# Patient Record
Sex: FEMALE | HISPANIC OR LATINO | Employment: FULL TIME | URBAN - METROPOLITAN AREA
[De-identification: names, ages, dates, MRNs, and addresses within clinical notes are randomized per-mention and may not be internally consistent; named-entity substitution may affect disease eponyms.]

---

## 2017-05-22 ENCOUNTER — TELEPHONE (OUTPATIENT)
Dept: OTHER | Facility: CLINIC | Age: 32
End: 2017-05-22

## 2017-05-22 NOTE — TELEPHONE ENCOUNTER
5/22/2017    Call Regarding Onboarding are Choices    Attempt 1    Message on voicemail     Comments: 0 DEP      Outreach   KV

## 2017-06-06 NOTE — TELEPHONE ENCOUNTER
6/6/2017    Call Regarding Onboarding [employer] Choices    Attempt 2    Message on voicemail     Comments:       Outreach   christopher

## 2017-06-14 NOTE — TELEPHONE ENCOUNTER
"6/14/2017        Patient busy declined Onboarding offer, will call Healthcare insurance if need help    Can \"Vy\" Omar  Central Scheduler      "

## 2017-07-03 DIAGNOSIS — F33.0 MAJOR DEPRESSIVE DISORDER, RECURRENT EPISODE, MILD (H): ICD-10-CM

## 2017-07-03 NOTE — TELEPHONE ENCOUNTER
Medication Detail      Disp Refills Start End JAY   sertraline (ZOLOFT) 100 MG tablet 90 tablet 1 12/22/2016  No   Sig: Take 1 tablet (100 mg) by mouth daily       Last Office Visit with [facility] primary care provider:  12/22/16        Last PHQ-9 score on record=   PHQ-9 SCORE 12/22/2016   Total Score MyChart -   Total Score 5

## 2017-07-05 ENCOUNTER — MYC REFILL (OUTPATIENT)
Dept: FAMILY MEDICINE | Facility: CLINIC | Age: 32
End: 2017-07-05

## 2017-07-05 DIAGNOSIS — F33.0 MAJOR DEPRESSIVE DISORDER, RECURRENT EPISODE, MILD (H): ICD-10-CM

## 2017-07-05 RX ORDER — SERTRALINE HYDROCHLORIDE 100 MG/1
100 TABLET, FILM COATED ORAL DAILY
Qty: 30 TABLET | Refills: 0 | Status: SHIPPED | OUTPATIENT
Start: 2017-07-05 | End: 2017-07-25

## 2017-07-05 RX ORDER — SERTRALINE HYDROCHLORIDE 100 MG/1
100 TABLET, FILM COATED ORAL DAILY
Qty: 0.01 TABLET | Refills: 0 | Status: SHIPPED | OUTPATIENT
Start: 2017-07-05 | End: 2017-07-05

## 2017-07-05 NOTE — TELEPHONE ENCOUNTER
Message from [employer]:  Original authorizing provider: Alondra Tatum MD, MD Seema Navas would like a refill of the following medications:  sertraline (ZOLOFT) 100 MG tablet [Alondra Tatum MD, MD]    [id number] [address]    Comment:

## 2017-07-05 NOTE — TELEPHONE ENCOUNTER
Medication is being filled for 1 time refill only due to:  Patient needs to be seen because due for depression F/U.     Signed Prescriptions:                        Disp   Refills    sertraline (ZOLOFT) 100 MG tablet          30 tab*0        Sig: Take 1 tablet (100 mg) by mouth daily PLEASE SCHEDULE           AN APPOINTMENT TO RECEIVE FUTURE REFILLS           [phone]  Authorizing Provider: COREY RESENDIZ  Ordering User: PHILOMENA FRANCISCO      Routing to  Reception - please advise depression F/U office visit    Thank you,  Philomena Francisco RN

## 2017-07-05 NOTE — TELEPHONE ENCOUNTER
DUPLICATE    Signed Prescriptions:                        Disp   Refills    sertraline (ZOLOFT) 100 MG tablet          0.01 t*0        Sig: Take 1 tablet (100 mg) by mouth daily  Authorizing Provider: WASHINGTON HUGHES  Ordering User: PHILOMENA VILLAFUERTE      Closing encounter - no further actions needed at this time    Sent shayy Villafuerte RN

## 2017-07-25 ENCOUNTER — OFFICE VISIT (OUTPATIENT)
Dept: FAMILY MEDICINE | Facility: CLINIC | Age: 32
End: 2017-07-25
Payer: COMMERCIAL

## 2017-07-25 VITALS
BODY MASS INDEX: 23.26 KG/M2 | TEMPERATURE: 98.9 F | WEIGHT: 136.25 LBS | DIASTOLIC BLOOD PRESSURE: 64 MMHG | OXYGEN SATURATION: 97 % | HEIGHT: 64 IN | SYSTOLIC BLOOD PRESSURE: 104 MMHG | HEART RATE: 64 BPM

## 2017-07-25 DIAGNOSIS — F33.0 MAJOR DEPRESSIVE DISORDER, RECURRENT EPISODE, MILD (H): ICD-10-CM

## 2017-07-25 DIAGNOSIS — F33.9 EPISODE OF RECURRENT MAJOR DEPRESSIVE DISORDER, UNSPECIFIED DEPRESSION EPISODE SEVERITY (H): Primary | ICD-10-CM

## 2017-07-25 PROCEDURE — 99213 OFFICE O/P EST LOW 20 MIN: CPT | Performed by: FAMILY MEDICINE

## 2017-07-25 RX ORDER — SERTRALINE HYDROCHLORIDE 100 MG/1
100 TABLET, FILM COATED ORAL DAILY
Qty: 90 TABLET | Refills: 1 | Status: SHIPPED | OUTPATIENT
Start: 2017-07-25 | End: 2018-02-04

## 2017-07-25 ASSESSMENT — ANXIETY QUESTIONNAIRES
1. FEELING NERVOUS, ANXIOUS, OR ON EDGE: SEVERAL DAYS
GAD7 TOTAL SCORE: 2
IF YOU CHECKED OFF ANY PROBLEMS ON THIS QUESTIONNAIRE, HOW DIFFICULT HAVE THESE PROBLEMS MADE IT FOR YOU TO DO YOUR WORK, TAKE CARE OF THINGS AT HOME, OR GET ALONG WITH OTHER PEOPLE: SOMEWHAT DIFFICULT
2. NOT BEING ABLE TO STOP OR CONTROL WORRYING: SEVERAL DAYS
7. FEELING AFRAID AS IF SOMETHING AWFUL MIGHT HAPPEN: NOT AT ALL
6. BECOMING EASILY ANNOYED OR IRRITABLE: NOT AT ALL
5. BEING SO RESTLESS THAT IT IS HARD TO SIT STILL: NOT AT ALL
3. WORRYING TOO MUCH ABOUT DIFFERENT THINGS: NOT AT ALL

## 2017-07-25 ASSESSMENT — PATIENT HEALTH QUESTIONNAIRE - PHQ9: 5. POOR APPETITE OR OVEREATING: NOT AT ALL

## 2017-07-25 NOTE — PROGRESS NOTES
"  SUBJECTIVE:                                                    Seema Navas is a 32 year old female who presents to clinic today for the following health issues:    Depression Followup    Status since last visit: Stable     See PHQ-9 for current symptoms.  Other associated symptoms: anxiety     Complicating factors:   Significant life event:  No   Current substance abuse:  None  Anxiety or Panic symptoms:  Yes-  Anxiety     PHQ-9  English  PHQ-9   Any Language      Amount of exercise or physical activity: None    Problems taking medications regularly: No    Medication side effects: none  Diet: vegetarian/vegan    Clinic visit on 12/22/16:  \"1. Major depressive disorder, recurrent episode, mild (H)  Stable. She had been prescribed 150 mg of sertraline but had been taking 100 mg and tolerated well. Her moods have been stable and improved on sertraline. She notes ooccasional  headaches as a side effect since on sertraline. She does not want to change her medication.   - sertraline (ZOLOFT) 100 MG tablet; Take 1 tablet (100 mg) by mouth daily  Dispense: 90 tablet; Refill: 1  2. Encounter for surveillance of contraceptive pills  Doing well on OCP; no h/o migraine with visual changes .  - levonorgestrel-ethinyl estradiol (AVIANE) 0.1-20 MG-MCG per tablet; Take 1 tablet by mouth daily 1 TABLET DAILY  Dispense: 84 tablet; Refill: 3  3. Need for prophylactic vaccination and inoculation against influenza  - FLU VAC, SPLIT VIRUS IM > 3 YO (QUADRIVALENT) [32311]  - Vaccine Administration, Initial [21124]\"      She states she is doing alright, but notes she still wants to sleep too much and has decreased energy. She has difficulty getting to work on time at times. Would like sertraline renewal and would like to remain at her current dose and requests a referral for therapy. Denies difficulty getting to sleep or staying asleep. Denies SI.       Problem list and histories reviewed & adjusted, as indicated.  Additional history: " as documented  Patient Active Problem List   Diagnosis     CARDIOVASCULAR SCREENING; LDL GOAL LESS THAN 160     CHEYENNE II (cervical intraepithelial neoplasia II)     Major depression     Dysthymic disorder     Past Surgical History:   Procedure Laterality Date     HC TOOTH EXTRACTION W/FORCEP  Jan 2010     NO HISTORY OF SURGERY         Social History   Substance Use Topics     Smoking status: Never Smoker     Smokeless tobacco: Never Used     Alcohol use 0.5 - 1.0 oz/week     1 - 2 Standard drinks or equivalent per week      Comment: 2 week      Family History   Problem Relation Age of Onset     Allergies Mother      bees         Current Outpatient Prescriptions   Medication Sig Dispense Refill     sertraline (ZOLOFT) 100 MG tablet Take 1 tablet (100 mg) by mouth daily PLEASE SCHEDULE AN APPOINTMENT TO RECEIVE FUTURE REFILLS [phone] 90 tablet 1     levonorgestrel-ethinyl estradiol (AVIANE) 0.1-20 MG-MCG per tablet Take 1 tablet by mouth daily 1 TABLET DAILY 84 tablet 3     [DISCONTINUED] sertraline (ZOLOFT) 100 MG tablet Take 1 tablet (100 mg) by mouth daily PLEASE SCHEDULE AN APPOINTMENT TO RECEIVE FUTURE REFILLS [phone] 30 tablet 0     order for DME Equipment being ordered: Therapy Light for Seasonal affective disorder 1 Device 0     Cholecalciferol (VITAMIN D) 2000 UNITS tablet Take 2,000 Units by mouth daily (Patient not taking: Reported on 7/25/2017) 100 tablet 0     Allergies   Allergen Reactions     No Known Drug Allergies      Recent Labs   Lab Test  02/26/16   0949  06/21/11   0912   LDL   --   72   HDL   --   72   TRIG   --   87   CR  0.84   --    GFRESTIMATED  78   --    GFRESTBLACK  >90   GFR Calc     --    POTASSIUM  4.3   --       BP Readings from Last 3 Encounters:   07/25/17 104/64   12/22/16 104/68   03/11/16 128/78    Wt Readings from Last 3 Encounters:   07/25/17 61.8 kg (136 lb 4 oz)   12/22/16 61.7 kg (136 lb)   03/07/16 60.8 kg (134 lb)        Reviewed and updated as  "needed this visit by clinical staff  Reviewed and updated as needed this visit by Provider    ROS:  Tremayne CALLES      This document serves as a record of the services and decisions personally performed and made by Alondra Tatum MD. It was created on her behalf by Gage Spencer, a trained medical scribe. The creation of this document is based the provider's statements to the medical scribes.    Scribe Gage Spencer, July 25, 2017    OBJECTIVE:     /64 (Cuff Size: Adult Regular)  Pulse 64  Temp 98.9  F (37.2  C) (Oral)  Ht 1.626 m (5' 4\")  Wt 61.8 kg (136 lb 4 oz)  SpO2 97%  BMI 23.39 kg/m2  Body mass index is 23.39 kg/(m^2).  GENERAL: healthy, alert and no distress  PSYCH: mentation appears normal, affect normal/bright    Diagnostic Test Results:  none     ASSESSMENT/PLAN:   1. Major depressive disorder, recurrent episode, mild (H)  She would like to remain on current dose of sertraline at 100mg daily. She had headaches on higher dose. She would like to schedule with Jan Hunter for therapy.   - sertraline (ZOLOFT) 100 MG tablet; Take 1 tablet (100 mg) by mouth daily PLEASE SCHEDULE AN APPOINTMENT TO RECEIVE FUTURE REFILLS [phone]  Dispense: 90 tablet; Refill: 1  - DEPRESSION ACTION PLAN (DAP)      Patient Instructions   1. Schedule with Jan Hunter  for either short-term or long-term care  2. Schedule preventative visit in the fall      The information in this document, created by the medical scribe for me, accurately reflects the services I personally performed and the decisions made by me. I have reviewed and approved this document for accuracy. 07/25/17      Alondra Tatum MD  [facility]    "

## 2017-07-25 NOTE — PATIENT INSTRUCTIONS
1. Schedule with Jan Hunter  for either short-term or long-term care  2. Schedule preventative visit in the fall

## 2017-07-25 NOTE — MR AVS SNAPSHOT
After Visit Summary   7/25/2017    Seema Navas    MRN: [id number]           Patient Information     Date Of Birth          1985        Visit Information        Provider Department      7/25/2017 9:00 AM Alondra Tatum MD [facility]        Today's Diagnoses     Episode of recurrent major depressive disorder, unspecified depression episode severity (H)    -  1    Major depressive disorder, recurrent episode, mild (H)          Care Instructions    1. Schedule with Jan Hunter  for either short-term or long-term care  2. Schedule preventative visit in the fall            Follow-ups after your visit        Your next 10 appointments already scheduled     Aug 14, 2017  5:30 PM CDT   New Visit with Jan Hunter [facility] ([facility]    [address] [place] [id number] [phone]            Oct 10, 2017  9:00 AM CDT   [facility]ond[facility]    [address] [place] [id number] [phone]           Bring a current list of meds and any records pertaining to this visit.  For Physicals, please bring immunization records and any forms needing to be filled out.  Please arrive 10 minutes early to complete paperwork.              Who to contact     If you have questions or need follow up information about today's clinic visit or your schedule please contact [facility] directly at [phone].  Normal or non-critical lab and imaging results will be communicated to you by MyChart, letter or phone within 4 business days after the clinic has received the results. If you do not hear from us within 7 days, please contact the clinic through MyChart or phone. If you have a critical or abnormal lab result, we will notify you by phone as soon as possible.  Submit refill requests through [employer] or call your pharmacy and they will  "forward the refill request to us. Please allow 3 business days for your refill to be completed.          Additional Information About Your Visit        [employer] Information     [employer] gives you secure access to your electronic health record. If you see a primary care provider, you can also send messages to your care team and make appointments. If you have questions, please call your primary care clinic.  If you do not have a primary care provider, please call [phone] and they will assist you.        Care EveryWhere ID     This is your Care EveryWhere ID. This could be used by other organizations to access your [facility] medical records  [id number]        Your Vitals Were     Pulse Temperature Height Pulse Oximetry BMI (Body Mass Index)       64 98.9  F (37.2  C) (Oral) 5' 4\" (1.626 m) 97% 23.39 kg/m2        Blood Pressure from Last 3 Encounters:   07/25/17 104/64   12/22/16 104/68   03/11/16 128/78    Weight from Last 3 Encounters:   07/25/17 136 lb 4 oz (61.8 kg)   12/22/16 136 lb (61.7 kg)   03/07/16 134 lb (60.8 kg)              We Performed the Following     DEPRESSION ACTION PLAN (DAP)          Where to get your medicines      [id number] - [address] [address]     Phone:  [phone]     sertraline 100 MG tablet          Primary Care Provider Office Phone # Fax #    Alondra Tatum [phone]       [facility] [address] [address]        Equal Access to Services     [facility] EDER : Hadii jimi bashir hadasho Sofelicia, waaxda luqadaha, qaybta kaalmada sofia bills. So [facility] [phone].    ATENCIÓN: Si habla español, tiene a cortes disposición servicios gratuitos de asistencia lingüística. Llame al [phone].    We comply with applicable federal civil rights laws and [place] laws. We do not discriminate on the basis of " race, color, national origin, age, disability sex, sexual orientation or gender identity.            Thank you!     Thank you for choosing [facility]  for your care. Our goal is always to provide you with excellent care. Hearing back from our patients is one way we can continue to improve our services. Please take a few minutes to complete the written survey that you may receive in the mail after your visit with us. Thank you!             Your Updated Medication List - Protect others around you: Learn how to safely use, store and throw away your medicines at www.disposemymeds.org.          This list is accurate as of: 7/25/17  4:57 PM.  Always use your most recent med list.                   Brand Name Dispense Instructions for use Diagnosis    levonorgestrel-ethinyl estradiol 0.1-20 MG-MCG per tablet    AVIANE    84 tablet    Take 1 tablet by mouth daily 1 TABLET DAILY    Encounter for surveillance of contraceptive pills       order for DME     1 Device    Equipment being ordered: Therapy Light for Seasonal affective disorder    Major depression       sertraline 100 MG tablet    ZOLOFT    90 tablet    Take 1 tablet (100 mg) by mouth daily PLEASE SCHEDULE AN APPOINTMENT TO RECEIVE FUTURE REFILLS [phone]    Major depressive disorder, recurrent episode, mild (H)       vitamin D 2000 UNITS tablet     100 tablet    Take 2,000 Units by mouth daily    Unspecified vitamin deficiency

## 2017-07-25 NOTE — LETTER
My Depression Action Plan  Name: Seema Navas   Date of Birth 1985  Date: 7/25/2017    My doctor: Alnodra Tatum   My clinic: [address] [address] [phone]          GREEN    ZONE   Good Control    What it looks like:     Things are going generally well. You have normal up s and down s. You may even feel depressed from time to time, but bad moods usually last less than a day.   What you need to do:  1. Continue to care for yourself (see self care plan)  2. Check your depression survival kit and update it as needed  3. Follow your physician s recommendations including any medication.  4. Do not stop taking medication unless you consult with your physician first.           YELLOW         ZONE Getting Worse    What it looks like:     Depression is starting to interfere with your life.     It may be hard to get out of bed; you may be starting to isolate yourself from others.    Symptoms of depression are starting to last most all day and this has happened for several days.     You may have suicidal thoughts but they are not constant.   What you need to do:     1. Call your care team, your response to treatment will improve if you keep your care team informed of your progress. Yellow periods are signs an adjustment may need to be made.     2. Continue your self-care, even if you have to fake it!    3. Talk to someone in your support network    4. Open up your depression survival kit           RED    ZONE Medical Alert - Get Help    What it looks like:     Depression is seriously interfering with your life.     You may experience these or other symptoms: You can t get out of bed most days, can t work or engage in other necessary activities, you have trouble taking care of basic hygiene, or basic responsibilities, thoughts of suicide or death that will not go away, self-injurious behavior.     What you need to do:  1. Call your care team and  request a same-day appointment. If they are not available (weekends or after hours) call your local crisis line, emergency room or 911.      Electronically signed by: Ines Gomes, July 25, 2017    Depression Self Care Plan / Survival Kit    Self-Care for Depression  Here s the deal. Your body and mind are really not as separate as most people think.  What you do and think affects how you feel and how you feel influences what you do and think. This means if you do things that people who feel good do, it will help you feel better.  Sometimes this is all it takes.  There is also a place for medication and therapy depending on how severe your depression is, so be sure to consult with your medical provider and/ or Behavioral Health Consultant if your symptoms are worsening or not improving.     In order to better manage my stress, I will:    Exercise  Get some form of exercise, every day. This will help reduce pain and release endorphins, the  feel good  chemicals in your brain. This is almost as good as taking antidepressants!  This is not the same as joining a gym and then never going! (they count on that by the way ) It can be as simple as just going for a walk or doing some gardening, anything that will get you moving.      Hygiene   Maintain good hygiene (Get out of bed in the morning, Make your bed, Brush your teeth, Take a shower, and Get dressed like you were going to work, even if you are unemployed).  If your clothes don't fit try to get ones that do.    Diet  I will strive to eat foods that are good for me, drink plenty of water, and avoid excessive sugar, caffeine, alcohol, and other mood-altering substances.  Some foods that are helpful in depression are: complex carbohydrates, B vitamins, flaxseed, fish or fish oil, fresh fruits and vegetables.    Psychotherapy  I agree to participate in Individual Therapy (if recommended).    Medication  If prescribed medications, I agree to take them.  Missing doses  can result in serious side effects.  I understand that drinking alcohol, or other illicit drug use, may cause potential side effects.  I will not stop my medication abruptly without first discussing it with my provider.    Staying Connected With Others  I will stay in touch with my friends, family members, and my primary care provider/team.    Use your imagination  Be creative.  We all have a creative side; it doesn t matter if it s oil painting, sand castles, or mud pies! This will also kick up the endorphins.    Witness Beauty  (AKA stop and smell the roses) Take a look outside, even in mid-winter. Notice colors, textures. Watch the squirrels and birds.     Service to others  Be of service to others.  There is always someone else in need.  By helping others we can  get out of ourselves  and remember the really important things.  This also provides opportunities for practicing all the other parts of the program.    Humor  Laugh and be silly!  Adjust your TV habits for less news and crime-drama and more comedy.    Control your stress  Try breathing deep, massage therapy, biofeedback, and meditation. Find time to relax each day.     My support system    Clinic Contact:  Phone number:    Contact 1:  Phone number:    Contact 2:  Phone number:    Baptism/:  Phone number:    Therapist:  Phone number:    Local crisis center:    Phone number:    Other community support:  Phone number:

## 2017-07-25 NOTE — NURSING NOTE
"Chief Complaint   Patient presents with     Depression       Initial /64 (Cuff Size: Adult Regular)  Pulse 64  Temp 98.9  F (37.2  C) (Oral)  Ht 5' 4\" (1.626 m)  Wt 136 lb 4 oz (61.8 kg)  SpO2 97%  BMI 23.39 kg/m2 Estimated body mass index is 23.39 kg/(m^2) as calculated from the following:    Height as of this encounter: 5' 4\" (1.626 m).    Weight as of this encounter: 136 lb 4 oz (61.8 kg).  Medication Reconciliation: complete     Ines Gomes, MONIKA      "

## 2017-07-26 ASSESSMENT — ANXIETY QUESTIONNAIRES: GAD7 TOTAL SCORE: 2

## 2017-07-26 ASSESSMENT — PATIENT HEALTH QUESTIONNAIRE - PHQ9: SUM OF ALL RESPONSES TO PHQ QUESTIONS 1-9: 8

## 2017-10-10 ENCOUNTER — OFFICE VISIT (OUTPATIENT)
Dept: FAMILY MEDICINE | Facility: CLINIC | Age: 32
End: 2017-10-10
Payer: COMMERCIAL

## 2017-10-10 VITALS
BODY MASS INDEX: 23.9 KG/M2 | DIASTOLIC BLOOD PRESSURE: 67 MMHG | TEMPERATURE: 98.1 F | WEIGHT: 140 LBS | SYSTOLIC BLOOD PRESSURE: 109 MMHG | HEIGHT: 64 IN | HEART RATE: 65 BPM | RESPIRATION RATE: 12 BRPM

## 2017-10-10 DIAGNOSIS — F34.1 DYSTHYMIC DISORDER: ICD-10-CM

## 2017-10-10 DIAGNOSIS — F33.9 EPISODE OF RECURRENT MAJOR DEPRESSIVE DISORDER, UNSPECIFIED DEPRESSION EPISODE SEVERITY (H): ICD-10-CM

## 2017-10-10 DIAGNOSIS — Z13.6 CARDIOVASCULAR SCREENING; LDL GOAL LESS THAN 160: ICD-10-CM

## 2017-10-10 DIAGNOSIS — Z00.00 ROUTINE GENERAL MEDICAL EXAMINATION AT A HEALTH CARE FACILITY: Primary | ICD-10-CM

## 2017-10-10 DIAGNOSIS — Z30.41 ENCOUNTER FOR SURVEILLANCE OF CONTRACEPTIVE PILLS: ICD-10-CM

## 2017-10-10 DIAGNOSIS — Z13.1 SCREENING FOR DIABETES MELLITUS: ICD-10-CM

## 2017-10-10 DIAGNOSIS — Z23 NEED FOR PROPHYLACTIC VACCINATION AND INOCULATION AGAINST INFLUENZA: ICD-10-CM

## 2017-10-10 PROCEDURE — 99395 PREV VISIT EST AGE 18-39: CPT | Mod: 25 | Performed by: FAMILY MEDICINE

## 2017-10-10 PROCEDURE — G0145 SCR C/V CYTO,THINLAYER,RESCR: HCPCS | Performed by: FAMILY MEDICINE

## 2017-10-10 PROCEDURE — 99207 C PAF COMPLETED  NO CHARGE: CPT | Performed by: FAMILY MEDICINE

## 2017-10-10 PROCEDURE — 90686 IIV4 VACC NO PRSV 0.5 ML IM: CPT | Performed by: FAMILY MEDICINE

## 2017-10-10 PROCEDURE — 87624 HPV HI-RISK TYP POOLED RSLT: CPT | Performed by: FAMILY MEDICINE

## 2017-10-10 PROCEDURE — 90471 IMMUNIZATION ADMIN: CPT | Performed by: FAMILY MEDICINE

## 2017-10-10 RX ORDER — LEVONORGESTREL/ETHIN.ESTRADIOL 0.1-0.02MG
1 TABLET ORAL DAILY
Qty: 84 TABLET | Refills: 3 | Status: SHIPPED | OUTPATIENT
Start: 2017-10-10 | End: 2018-08-14

## 2017-10-10 NOTE — MR AVS SNAPSHOT
After Visit Summary   10/10/2017    Seema Navas    MRN: [id number]           Patient Information     Date Of Birth          1985        Visit Information        Provider Department      10/10/2017 9:00 AM Alondra Tatum MD [facility]        Today's Diagnoses     Routine general medical examination at a health care facility    -  1    Episode of recurrent major depressive disorder, unspecified depression episode severity (H)        CHEYENNE II (cervical intraepithelial neoplasia II)        Dysthymic disorder        Encounter for surveillance of contraceptive pills        CARDIOVASCULAR SCREENING; LDL GOAL LESS THAN 160        Screening for diabetes mellitus        Need for prophylactic vaccination and inoculation against influenza          Care Instructions    1. You could try assessing your diet on a program like [email or web address] for a couple weeks to see if you are getting adequate nutrition on your vegetarian diet.    2. Schedule fasting lab draw.  3. Let me know if you would like a referral to a psychiatrist to work with you on adjusting your medication.     Preventive Health Recommendations  Female Ages 26 - 39  Yearly exam:   See your health care provider every year in order to    Review health changes.     Discuss preventive care.      Review your medicines if you your doctor has prescribed any.    Until age 30: Get a Pap test every three years (more often if you have had an abnormal result).    After age 30: Talk to your doctor about whether you should have a Pap test every 3 years or have a Pap test with HPV screening every 5 years.   You do not need a Pap test if your uterus was removed (hysterectomy) and you have not had cancer.  You should be tested each year for STDs (sexually transmitted diseases), if you're at risk.   Talk to your provider about how often to have your cholesterol checked.  If you are at risk for diabetes, you should have a diabetes test (fasting  glucose).  Shots: Get a flu shot each year. Get a tetanus shot every 10 years.   Nutrition:     Eat at least 5 servings of fruits and vegetables each day.    Eat whole-grain bread, whole-wheat pasta and brown rice instead of white grains and rice.    Talk to your provider about Calcium and Vitamin D.     Lifestyle    Exercise at least 150 minutes a week (30 minutes a day, 5 days of the week). This will help you control your weight and prevent disease.    Limit alcohol to one drink per day.    No smoking.     Wear sunscreen to prevent skin cancer.    See your dentist every six months for an exam and cleaning.        Eating a Vegetarian Diet  A vegetarian diet is based on plant foods. It includes fruits, vegetables, beans, grains, seeds, and nuts. Some vegetarians also eat dairy foods and eggs. There are three common vegetarian diets:    Lacto-ovo vegetarians eat eggs, yogurt, cheese, and other milk products, as well as plant foods.    Lacto vegetarians eat dairy and plant foods but not eggs.    Vegans eat only plant foods.  Why eat vegetarian?  People choose to be vegetarians for health, cultural, social, and Latter-day reasons. A vegetarian diet is a healthy way to eat. You just have to plan your meals carefully so that you get all the nutrients you need. Most vegetarian diets are high in fiber and low in fat and cholesterol. That means eating vegetarian can:    Lower your risk of heart disease.    Lower your blood pressure and cholesterol levels.    Help you maintain a healthy weight.    Decrease digestive problems including:    Bowel diseases    Gallstones    Colon cancer  Vegetarian basics  A vegetarian diet can be a healthy way to eat for people of all ages. But meals and snacks must be planned to include non-meat sources of protein, vitamins, and other nutrients. (See the chart below.) Here are some guidelines for healthy meal planning:    Eat a wide range of foods. This will help you get all the nutrients you  need.    Eat a number of plant proteins throughout the day.    Plan for enough calories each day. Also make sure that your calories come from foods that are rich in protein, vitamins, and minerals.    If you eat dairy foods, choose low-fat or fat-free milk, yogurt, or cheese.  Do you need supplements?  A vegetarian diet can easily supply all the calories, protein, vitamins, and minerals that a person needs. But some people have special needs. They may include children and teens, pregnant and lactating women, women past midlife, the elderly, and vegans. If you are in one of these groups, you may need extra calories, protein, calcium, iron, vitamin B12, or zinc. The lists below can help you choose foods that are good sources of these nutrients. And be sure to ask your health care provider about taking vitamin supplements.  Protein    Dried beans, soybeans, and lentils    Tofu (bean curd) and tempeh (cultured soybeans)    Rice, barley, and other whole grains    Nuts and nut butter    Milk, yogurt, and cheese    Eggs Vitamin B12    Milk, yogurt, and cheese    Eggs    Fortified soy burgers    Fortified soy milk or other nondairy milk    Fortified cereals    Nutritional yeast   Zinc    Milk, yogurt, and cheese    Eggs    Canned or dried beans    Lentils and split peas    Wheat germ    Whole-grain breads and cereals    Nuts and nut butters    Pumpkin and sunflower seeds Calcium    Milk, yogurt, and cheese    Fortified soy milk or other nondairy milk    Tofu processed with calcium sulfate    Leafy, dark-green vegetables    Dried figs    Fortified orange juice and fortified cereals    Sesame seeds    Beans   Iron    Wheat germ    Dried fruits    Nuts and seeds    Whole grain and fortified breads and cereals    Dried beans, lentils, and split peas    Leafy, dark-green vegetables    Eggs     Getting started  Change to a vegetarian diet slowly. Start by eating more grains, beans, vegetables, and fruits. Make fish, poultry, or  meat a side dish. Then slowly cut them out of your diet. Here are some other tips:    Eat three or more servings of vegetables a day. Eat them raw or lightly steamed.    Eat two or more servings of fruit a day. Choose whole fruits with the skin on.    Choose a wide range of grains and whole-grain breads and cereals. Eat six or more servings of these foods each day.    Begin to replace meat by working up to two to three servings a day of beans, lentils, split peas, tofu, or tempeh.    If you eat dairy foods, have two to three servings a day. Make low-fat or fat-free choices.  For vegans: Add sources of calcium and vitamin B12, such as fortified nondairy milks and breakfast cereals. Talk to your health care provider about vitamin supplements.  To learn more  A registered dietitian (RD) can help you plan a healthy vegetarian diet. For more information and to find an RD who knows about vegetarian diets, search for one through the Vegetarian Nutrition Dietetic Practice Group of the Academy of Nutrition and Dietetics (AND) at their website, www.vegetariannutrition.net. You can also search AND's website, www.eatright.org. Other groups that can help include:    Vegetarian Resource Group (www.vrg.org)    American Cancer Society (www.cancer.org)    American Heart Association (www.heart.org)  Date Last Reviewed: 6/1/2015 2000-2017 [employer]. [address] [place] [address]. All rights reserved. This information is not intended as a substitute for professional medical care. Always follow your healthcare professional's instructions.                Follow-ups after your visit        Future tests that were ordered for you today     Open Future Orders        Priority Expected Expires Ordered    Lipid panel reflex to direct LDL Routine  10/10/2018 10/10/2017    Glucose Routine  10/10/2018 10/10/2017            Who to contact     If you have questions or need follow up information about today's clinic  "visit or your schedule please contact [facility] directly at [phone].  Normal or non-critical lab and imaging results will be communicated to you by MyChart, letter or phone within 4 business days after the clinic has received the results. If you do not hear from us within 7 days, please contact the clinic through [employer] or phone. If you have a critical or abnormal lab result, we will notify you by phone as soon as possible.  Submit refill requests through [employer] or call your pharmacy and they will forward the refill request to us. Please allow 3 business days for your refill to be completed.          Additional Information About Your Visit        [employer] Information     [employer] gives you secure access to your electronic health record. If you see a primary care provider, you can also send messages to your care team and make appointments. If you have questions, please call your primary care clinic.  If you do not have a primary care provider, please call [phone] and they will assist you.        Care EveryWhere ID     This is your Care EveryWhere ID. This could be used by other organizations to access your [facility] medical records  [id number]        Your Vitals Were     Pulse Temperature Respirations Height Last Period BMI (Body Mass Index)    65 98.1  F (36.7  C) (Oral) 12 5' 4\" (1.626 m) 09/28/2017 24.03 kg/m2       Blood Pressure from Last 3 Encounters:   10/10/17 109/67   07/25/17 104/64   12/22/16 104/68    Weight from Last 3 Encounters:   10/10/17 140 lb (63.5 kg)   07/25/17 136 lb 4 oz (61.8 kg)   12/22/16 136 lb (61.7 kg)              We Performed the Following     FLU VAC, SPLIT VIRUS IM > 3 YO (QUADRIVALENT) [35172]     HPV High Risk Types DNA Cervical     Pap imaged thin layer screen with HPV - recommended age 30 - 65 years (select HPV order below)     Vaccine Administration, Initial [62268]          Where to get your medicines      These medications were sent to [id number] - [place], [place] - [address]  [address], [facility] [address]     Phone:  [phone]     levonorgestrel-ethinyl estradiol 0.1-20 MG-MCG per tablet          Primary Care Provider Office Phone # Fax #    Alondra Tatum [phone] [address]  [facility] [address]        Equal Access to Services     ALAN GAINES : Hadii aad ku hadasho Soomaali, waaxda luqadaha, qaybta kaalmada adeegyada, waxay idiin hayaan adeeg khdeonnash ladavid . So [facility] [phone].    ATENCIÓN: Si habla español, tiene a cortes disposición servicios gratuitos de asistencia lingüística. [place] al [phone].    We comply with applicable federal civil rights laws and [place] laws. We do not discriminate on the basis of race, color, national origin, age, disability, sex, sexual orientation, or gender identity.            Thank you!     Thank you for choosing [facility]  for your care. Our goal is always to provide you with excellent care. Hearing back from our patients is one way we can continue to improve our services. Please take a few minutes to complete the written survey that you may receive in the mail after your visit with us. Thank you!             Your Updated Medication List - Protect others around you: Learn how to safely use, store and throw away your medicines at www.disposemymeds.org.          This list is accurate as of: 10/10/17  9:47 AM.  Always use your most recent med list.                   Brand Name Dispense Instructions for use Diagnosis    levonorgestrel-ethinyl estradiol 0.1-20 MG-MCG per tablet    AVIANE    84 tablet    Take 1 tablet by mouth daily 1 TABLET DAILY    Encounter for surveillance of contraceptive pills       order for DME     1 Device    Equipment being ordered: Therapy Light for Seasonal affective disorder    Major depression       sertraline 100 MG tablet    ZOLOFT    90 tablet    Take 1 tablet (100 mg) by mouth daily PLEASE  SCHEDULE AN APPOINTMENT TO RECEIVE FUTURE REFILLS [phone]    Major depressive disorder, recurrent episode, mild (H)       vitamin D 2000 UNITS tablet     100 tablet    Take 2,000 Units by mouth daily    Unspecified vitamin deficiency

## 2017-10-10 NOTE — PATIENT INSTRUCTIONS
1. You could try assessing your diet on a program like myfitnesspal.com for a couple weeks to see if you are getting adequate nutrition on your vegetarian diet.    2. Schedule fasting lab draw.  3. Let me know if you would like a referral to a psychiatrist to work with you on adjusting your medication.     Preventive Health Recommendations  Female Ages 26 - 39  Yearly exam:   See your health care provider every year in order to    Review health changes.     Discuss preventive care.      Review your medicines if you your doctor has prescribed any.    Until age 30: Get a Pap test every three years (more often if you have had an abnormal result).    After age 30: Talk to your doctor about whether you should have a Pap test every 3 years or have a Pap test with HPV screening every 5 years.   You do not need a Pap test if your uterus was removed (hysterectomy) and you have not had cancer.  You should be tested each year for STDs (sexually transmitted diseases), if you're at risk.   Talk to your provider about how often to have your cholesterol checked.  If you are at risk for diabetes, you should have a diabetes test (fasting glucose).  Shots: Get a flu shot each year. Get a tetanus shot every 10 years.   Nutrition:     Eat at least 5 servings of fruits and vegetables each day.    Eat whole-grain bread, whole-wheat pasta and brown rice instead of white grains and rice.    Talk to your provider about Calcium and Vitamin D.     Lifestyle    Exercise at least 150 minutes a week (30 minutes a day, 5 days of the week). This will help you control your weight and prevent disease.    Limit alcohol to one drink per day.    No smoking.     Wear sunscreen to prevent skin cancer.    See your dentist every six months for an exam and cleaning.        Eating a Vegetarian Diet  A vegetarian diet is based on plant foods. It includes fruits, vegetables, beans, grains, seeds, and nuts. Some vegetarians also eat dairy foods and eggs. There  are three common vegetarian diets:    Lacto-ovo vegetarians eat eggs, yogurt, cheese, and other milk products, as well as plant foods.    Lacto vegetarians eat dairy and plant foods but not eggs.    Vegans eat only plant foods.  Why eat vegetarian?  People choose to be vegetarians for health, cultural, social, and Uatsdin reasons. A vegetarian diet is a healthy way to eat. You just have to plan your meals carefully so that you get all the nutrients you need. Most vegetarian diets are high in fiber and low in fat and cholesterol. That means eating vegetarian can:    Lower your risk of heart disease.    Lower your blood pressure and cholesterol levels.    Help you maintain a healthy weight.    Decrease digestive problems including:    Bowel diseases    Gallstones    Colon cancer  Vegetarian basics  A vegetarian diet can be a healthy way to eat for people of all ages. But meals and snacks must be planned to include non-meat sources of protein, vitamins, and other nutrients. (See the chart below.) Here are some guidelines for healthy meal planning:    Eat a wide range of foods. This will help you get all the nutrients you need.    Eat a number of plant proteins throughout the day.    Plan for enough calories each day. Also make sure that your calories come from foods that are rich in protein, vitamins, and minerals.    If you eat dairy foods, choose low-fat or fat-free milk, yogurt, or cheese.  Do you need supplements?  A vegetarian diet can easily supply all the calories, protein, vitamins, and minerals that a person needs. But some people have special needs. They may include children and teens, pregnant and lactating women, women past midlife, the elderly, and vegans. If you are in one of these groups, you may need extra calories, protein, calcium, iron, vitamin B12, or zinc. The lists below can help you choose foods that are good sources of these nutrients. And be sure to ask your health care provider about taking  vitamin supplements.  Protein    Dried beans, soybeans, and lentils    Tofu (bean curd) and tempeh (cultured soybeans)    Rice, barley, and other whole grains    Nuts and nut butter    Milk, yogurt, and cheese    Eggs Vitamin B12    Milk, yogurt, and cheese    Eggs    Fortified soy burgers    Fortified soy milk or other nondairy milk    Fortified cereals    Nutritional yeast   Zinc    Milk, yogurt, and cheese    Eggs    Canned or dried beans    Lentils and split peas    Wheat germ    Whole-grain breads and cereals    Nuts and nut butters    Pumpkin and sunflower seeds Calcium    Milk, yogurt, and cheese    Fortified soy milk or other nondairy milk    Tofu processed with calcium sulfate    Leafy, dark-green vegetables    Dried figs    Fortified orange juice and fortified cereals    Sesame seeds    Beans   Iron    Wheat germ    Dried fruits    Nuts and seeds    Whole grain and fortified breads and cereals    Dried beans, lentils, and split peas    Leafy, dark-green vegetables    Eggs     Getting started  Change to a vegetarian diet slowly. Start by eating more grains, beans, vegetables, and fruits. Make fish, poultry, or meat a side dish. Then slowly cut them out of your diet. Here are some other tips:    Eat three or more servings of vegetables a day. Eat them raw or lightly steamed.    Eat two or more servings of fruit a day. Choose whole fruits with the skin on.    Choose a wide range of grains and whole-grain breads and cereals. Eat six or more servings of these foods each day.    Begin to replace meat by working up to two to three servings a day of beans, lentils, split peas, tofu, or tempeh.    If you eat dairy foods, have two to three servings a day. Make low-fat or fat-free choices.  For vegans: Add sources of calcium and vitamin B12, such as fortified nondairy milks and breakfast cereals. Talk to your health care provider about vitamin supplements.  To learn more  A registered dietitian (RD) can help you plan  a healthy vegetarian diet. For more information and to find an RD who knows about vegetarian diets, search for one through the Vegetarian Nutrition Dietetic Practice Group of the Academy of Nutrition and Dietetics (AND) at their website, www.vegetariannutrition.net. You can also search AND's website, www.eatright.org. Other groups that can help include:    Vegetarian Resource Group (www.vrg.org)    American Cancer Society (www.cancer.org)    American Heart Association (www.heart.org)  Date Last Reviewed: 6/1/2015 2000-2017 [employer]. [address] [place] [address], [place] [address]. All rights reserved. This information is not intended as a substitute for professional medical care. Always follow your healthcare professional's instructions.

## 2017-10-10 NOTE — NURSING NOTE
Per orders of Dr. Tatum, injection of flu shot  given by GIO HEATH Patient instructed to remain in clinic for 15 minutes afterwards, and to report any adverse reaction to me immediately.

## 2017-10-10 NOTE — PROGRESS NOTES
SUBJECTIVE:   CC: Seema Navas is an 32 year old woman who presents for preventive health visit.     Physical   Annual:     Getting at least 3 servings of Calcium per day::  Yes    Bi-annual eye exam::  Yes    Dental care twice a year::  Yes    Sleep apnea or symptoms of sleep apnea::  None    Diet::  Regular (no restrictions)    Frequency of exercise::  1 day/week    Duration of exercise::  Less than 15 minutes    Taking medications regularly::  Yes    Medication side effects::  None    Additional concerns today::  No    Answers for HPI/ROS submitted by the patient on 10/10/2017   PHQ-2 Score: 2      Fatigue - She is dealing with fatigue. Since she stopped eating meat a year ago she feels it is more pronounced. She is curious if this is related to her nutrition and if her diet is adequate. She does not take Vitamin B12 or iron supplements.       Depression - Her depression is stable on Zoloft 100 mg. She thinks her mood could possibly be contributing to her decreased energy level. She was on the 150 mg dose of Zoloft in the past and did not notice a difference.  She forgets what she was going to do or say, and it takes longer for her to retrieve these thoughts.     Headaches - Patient feels her headaches may be more frequent. No report of severe headaches. This is longstanding. Denies vision changes.    Pain with intercourse - She continues to have pain with intercourse, which is unchanged. Denies concern for STD.    She does not want to discuss the above in any more detail today. She agrees to come back if persistent symptoms.   Wt Readings from Last 5 Encounters:   10/10/17 63.5 kg (140 lb)   07/25/17 61.8 kg (136 lb 4 oz)   12/22/16 61.7 kg (136 lb)   03/07/16 60.8 kg (134 lb)   02/19/16 59.4 kg (131 lb)     Today's PHQ-2 Score:   PHQ-2 ( 1999 [employer]) 10/10/2017   Q1: Little interest or pleasure in doing things 1   Q2: Feeling down, depressed or hopeless 1   PHQ-2 Score 2   Q1: Little interest or pleasure  in doing things Several days   Q2: Feeling down, depressed or hopeless Several days   PHQ-2 Score 2     Abuse: Current or Past(Physical, Sexual or Emotional)- No  Do you feel safe in your environment - Yes    Social History   Substance Use Topics     Smoking status: Never Smoker     Smokeless tobacco: Never Used     Alcohol use 0.5 - 1.0 oz/week     1 - 2 Standard drinks or equivalent per week      Comment: 2 week      The patient does not drink >3 drinks per day nor >7 drinks per week.    Reviewed orders with patient.  Reviewed health maintenance and updated orders accordingly - Yes  BP Readings from Last 3 Encounters:   10/10/17 109/67   07/25/17 104/64   12/22/16 104/68    Wt Readings from Last 3 Encounters:   10/10/17 63.5 kg (140 lb)   07/25/17 61.8 kg (136 lb 4 oz)   12/22/16 61.7 kg (136 lb)        Patient Active Problem List   Diagnosis     CARDIOVASCULAR SCREENING; LDL GOAL LESS THAN 160     CHEYENNE II (cervical intraepithelial neoplasia II)     Major depression     Dysthymic disorder     Past Surgical History:   Procedure Laterality Date     HC TOOTH EXTRACTION W/FORCEP  Jan 2010     NO HISTORY OF SURGERY         Social History   Substance Use Topics     Smoking status: Never Smoker     Smokeless tobacco: Never Used     Alcohol use 0.5 - 1.0 oz/week     1 - 2 Standard drinks or equivalent per week      Comment: 2 week      Family History   Problem Relation Age of Onset     Allergies Mother      bees         Current Outpatient Prescriptions   Medication Sig Dispense Refill     sertraline (ZOLOFT) 100 MG tablet Take 1 tablet (100 mg) by mouth daily PLEASE SCHEDULE AN APPOINTMENT TO RECEIVE FUTURE REFILLS [phone] 90 tablet 1     levonorgestrel-ethinyl estradiol (AVIANE) 0.1-20 MG-MCG per tablet Take 1 tablet by mouth daily 1 TABLET DAILY 84 tablet 3     order for DME Equipment being ordered: Therapy Light for Seasonal affective disorder 1 Device 0     Cholecalciferol (VITAMIN D) 2000 UNITS tablet Take 2,000  "Units by mouth daily 100 tablet 0     Allergies   Allergen Reactions     No Known Drug Allergies      Recent Labs   Lab Test  16   0949  11   0912   LDL   --   72   HDL   --   72   TRIG   --   87   CR  0.84   --    GFRESTIMATED  78   --    GFRESTBLACK  >90   GFR Calc     --    POTASSIUM  4.3   --       Mammogram not appropriate for this patient based on age.  Pertinent mammograms are reviewed under the imaging tab.  History of abnormal Pap smear:   NO - age 30- 65 PAP every 3 years recommended  Last 3 Pap Results:   PAP (no units)   Date Value   11/10/2014 NIL   10/21/2013 NIL   2012 NIL     Reviewed and updated as needed this visit by clinical staffTobacco  Allergies  Meds  Med Hx  Surg Hx  Fam Hx  Soc Hx      Reviewed and updated as needed this visit by Provider        Past Medical History:   Diagnosis Date     Lumbago     xray of spine in 6/10 - degenerative disk     Mild major depression (H) 2011    Managed by psychiatry      Papanicolaou smear of cervix with low grade squamous intraepithelial lesion (LGSIL) 6/11/10, 3/15/11    CHEYENNE I & II      Past Surgical History:   Procedure Laterality Date     HC TOOTH EXTRACTION W/FORCEP  2010     NO HISTORY OF SURGERY       Obstetric History       T0      L0     SAB0   TAB0   Ectopic0   Multiple0   Live Births0           ROS:   ROS: 10 point ROS neg other than the symptoms noted above in the HPI.    This document serves as a record of the services and decisions personally performed and made by Alondra Tatum MD. It was created on his/her behalf by Fidelia Shin, trained medical scribe. The creation of this document is based the provider's statements to the medical scribes.    Scribvanessa Shin, October 10, 2017  OBJECTIVE:   /67  Pulse 65  Temp 98.1  F (36.7  C) (Oral)  Resp 12  Ht 1.626 m (5' 4\")  Wt 63.5 kg (140 lb)  LMP 2017  BMI 24.03 kg/m2  EXAM:  GENERAL: healthy, alert and no " distress  EYES: Eyes grossly normal to inspection, PERRL and conjunctivae and sclerae normal  HENT: ear canals and TM's normal, nose and mouth without ulcers or lesions  NECK: no adenopathy, no asymmetry, masses, or scars and thyroid normal to palpation  RESP: lungs clear to auscultation - no rales, rhonchi or wheezes  BREAST: normal without masses, tenderness or nipple discharge and no palpable axillary masses or adenopathy  CV: regular rate and rhythm, normal S1 S2, no S3 or S4, no murmur, click or rub, no peripheral edema and peripheral pulses strong  ABDOMEN: soft, nontender, no hepatosplenomegaly, no masses and bowel sounds normal   (female): normal female external genitalia, normal urethral meatus, vaginal mucosa pink, moist, well rugated, and normal cervix   MS: no gross musculoskeletal defects noted, no edema  SKIN: no suspicious lesions or rashes  NEURO: Normal strength and tone, mentation intact and speech normal  PSYCH: mentation appears normal, affect normal/bright  ASSESSMENT/PLAN:   1. Routine general medical examination at a health care facility  PAP completed. She will schedule fasting lab draw.   - Pap imaged thin layer screen with HPV - recommended age 30 - 65 years (select HPV order below)  - HPV High Risk Types DNA Cervical    2. Episode of recurrent major depressive disorder, unspecified depression episode severity (H)  Stable. Continues on Zoloft 100 mg daily. She agrees to call if she would like to discuss medication adjustment or change and could refer to Dr. Malone.     3. H/o CHEYENNE II (cervical intraepithelial neoplasia II) last 3 paps normal    - Pap imaged thin layer screen with HPV - recommended age 30 - 65 years (select HPV order below)  - HPV High Risk Types DNA Cervical    4. Dysthymic disorder  As above     5. Encounter for surveillance of contraceptive pills    - levonorgestrel-ethinyl estradiol (AVIANE) 0.1-20 MG-MCG per tablet; Take 1 tablet by mouth daily 1 TABLET DAILY  Dispense:  "84 tablet; Refill: 3    6. CARDIOVASCULAR SCREENING; LDL GOAL LESS THAN 160    - Lipid panel reflex to direct LDL; Future    7. Screening for diabetes mellitus    - Glucose; Future    8. Need for prophylactic vaccination and inoculation against influenza    - FLU VAC, SPLIT VIRUS IM > 3 YO (QUADRIVALENT) [92366]  - Vaccine Administration, Initial [77487]      COUNSELING:  Reviewed preventive health counseling, as reflected in patient instructions   reports that she has never smoked. She has never used smokeless tobacco.  Estimated body mass index is 24.03 kg/(m^2) as calculated from the following:    Height as of this encounter: 1.626 m (5' 4\").    Weight as of this encounter: 63.5 kg (140 lb).   Weight management plan: Discussed healthy diet and exercise guidelines and patient will follow up in 12 months in clinic to re-evaluate.    Counseling Resources:  ATP IV Guidelines  Pooled Cohorts Equation Calculator  Breast Cancer Risk Calculator  FRAX Risk Assessment  ICSI Preventive Guidelines  Dietary Guidelines for Americans, 2010  [employer]'s MyPlate  ASA Prophylaxis  Lung CA Screening    The information in this document, created by the medical scribe for me, accurately reflects the services I personally performed and the decisions made by me. I have reviewed and approved this document for accuracy. 10/10/17    Alondra Tatum MD  [facility]      Injectable Influenza Immunization Documentation    1.  Is the person to be vaccinated sick today?   No    2. Does the person to be vaccinated have an allergy to a component   of the vaccine?   No    3. Has the person to be vaccinated ever had a serious reaction   to influenza vaccine in the past?   No    4. Has the person to be vaccinated ever had Guillain-Barré syndrome?   No    Form completed by Kathy Stover MA            "

## 2017-10-12 LAB
COPATH REPORT: NORMAL
PAP: NORMAL

## 2017-10-13 LAB
FINAL DIAGNOSIS: NORMAL
HPV HR 12 DNA CVX QL NAA+PROBE: NEGATIVE
HPV16 DNA SPEC QL NAA+PROBE: NEGATIVE
HPV18 DNA SPEC QL NAA+PROBE: NEGATIVE
SPECIMEN DESCRIPTION: NORMAL

## 2018-02-04 DIAGNOSIS — F33.0 MAJOR DEPRESSIVE DISORDER, RECURRENT EPISODE, MILD (H): ICD-10-CM

## 2018-02-05 NOTE — TELEPHONE ENCOUNTER
"Requested Prescriptions   Pending Prescriptions Disp Refills     sertraline (ZOLOFT) 100 MG tablet [Pharmacy Med Name: Sertraline HCl Oral Tablet 100 MG]  Last Written Prescription Date:  7/25/2017  Last Fill Quantity: 90 tablet,  # refills: 1   Last Office Visit: 10/10/2017   Future Office Visit:      90 tablet 0     Sig: TAKE ONE TABLET BY MOUTH ONE TIME DAILY .PLEASE SCHEDULE APPT [phone]    SSRIs Protocol Failed    2/4/2018  6:52 PM       Failed - PHQ-9 score less than 5 in past 6 months    The PHQ-9 criteria is meant to fail. It requires a PHQ-9 score review  PHQ-9 SCORE 8/2/2016 12/22/2016 7/25/2017   Total Score - - -   Total Score MyChart - - -   Total Score 12 5 8     MARIELA-7 SCORE 8/24/2015 12/22/2016 7/25/2017   Total Score 4 1 2          Passed - Patient is age 18 or older       Passed - No active pregnancy on record       Passed - No positive pregnancy test in last 12 months       Passed - Recent (6 mo) or future visit with authorizing provider's specialty    Patient had office visit in the last 6 months or has a visit in the next 30 days with authorizing provider.  See \"Patient Info\" tab in inbasket, or \"Choose Columns\" in Meds & Orders section of the refill encounter.              "

## 2018-02-08 NOTE — TELEPHONE ENCOUNTER
Patient called checking the status on this and stating she is leaving town later today so needs asap    Patient did schedule an appt on 2/27/18 if she needs to be seen for more refills?    Please advise if so and ok to leave a message

## 2018-02-09 NOTE — TELEPHONE ENCOUNTER
ELIANA for pt.  I would like to do a phq9 over the phone.  Apologized for delay in response and refill.  SRINIVAS Chowdhury

## 2018-02-14 RX ORDER — SERTRALINE HYDROCHLORIDE 100 MG/1
TABLET, FILM COATED ORAL
Qty: 90 TABLET | Refills: 1 | Status: SHIPPED | OUTPATIENT
Start: 2018-02-14 | End: 2018-08-05

## 2018-02-14 NOTE — TELEPHONE ENCOUNTER
We have not heard back from pt to do phq9.  Routing refill to pcp.  Looks like pt was leaving town on 2/8/18.  Do you want to send in a short supply for pt?  Last ov=10/10/17.  PHQ-9 SCORE 8/2/2016 12/22/2016 7/25/2017   Total Score - - -   Total Score [place] - - -   Total Score 12 5 8     SRINIVAS Chowdhury

## 2018-02-14 NOTE — TELEPHONE ENCOUNTER
PHQ-9 SCORE 12/22/2016 7/25/2017 2/14/2018   Total Score - - -   Total Score MyChart - - -   Total Score 5 8 1     Pt called back and did the phq9.  Prescription approved per [facility] Ref[facility] Protocol.  SRINIVAS Chowdhury

## 2018-02-15 ASSESSMENT — PATIENT HEALTH QUESTIONNAIRE - PHQ9: SUM OF ALL RESPONSES TO PHQ QUESTIONS 1-9: 1

## 2018-08-05 ENCOUNTER — MYC REFILL (OUTPATIENT)
Dept: FAMILY MEDICINE | Facility: CLINIC | Age: 33
End: 2018-08-05

## 2018-08-05 DIAGNOSIS — F33.0 MAJOR DEPRESSIVE DISORDER, RECURRENT EPISODE, MILD (H): ICD-10-CM

## 2018-08-07 NOTE — TELEPHONE ENCOUNTER
"Requested Prescriptions   Pending Prescriptions Disp Refills     sertraline (ZOLOFT) 100 MG tablet  Last Written Prescription Date:  2/14/2018  Last Fill Quantity: 90 TABLET,  # refills: 1   Last Office Visit: 10/10/2017   Future Office Visit:    Next 5 appointments (look out 90 days)     Aug 14, 2018  8:20 AM CDT   Brennon Vizcaino with Alondra Tatum MD   [facility] ([facility])    [address] [place] [id number] [phone]                90 tablet 1     Sig: Take one tablet by mouth one time daily.    SSRIs Protocol Failed    8/6/2018  7:18 AM       Failed - PHQ-9 score less than 5 in past 6 months    Please review last PHQ-9 score.   PHQ-9 SCORE 12/22/2016 7/25/2017 2/14/2018   Total Score - - -   Total Score MyChart - - -   Total Score 5 8 1     MARIELA-7 SCORE 8/24/2015 12/22/2016 7/25/2017   Total Score 4 1 2          Passed - Patient is age 18 or older       Passed - No active pregnancy on record       Passed - No positive pregnancy test in last 12 months       Passed - Recent (6 mo) or future (30 days) visit within the authorizing provider's specialty    Patient had office visit in the last 6 months or has a visit in the next 30 days with authorizing provider or within the authorizing provider's specialty.  See \"Patient Info\" tab in inbasket, or \"Choose Columns\" in Meds & Orders section of the refill encounter.              "

## 2018-08-08 RX ORDER — SERTRALINE HYDROCHLORIDE 100 MG/1
TABLET, FILM COATED ORAL
Qty: 30 TABLET | Refills: 0 | Status: SHIPPED | OUTPATIENT
Start: 2018-08-08 | End: 2018-08-14

## 2018-08-14 ENCOUNTER — OFFICE VISIT (OUTPATIENT)
Dept: FAMILY MEDICINE | Facility: CLINIC | Age: 33
End: 2018-08-14
Payer: COMMERCIAL

## 2018-08-14 VITALS
OXYGEN SATURATION: 98 % | HEIGHT: 64 IN | DIASTOLIC BLOOD PRESSURE: 74 MMHG | HEART RATE: 69 BPM | RESPIRATION RATE: 15 BRPM | WEIGHT: 138.5 LBS | BODY MASS INDEX: 23.64 KG/M2 | TEMPERATURE: 98.7 F | SYSTOLIC BLOOD PRESSURE: 112 MMHG

## 2018-08-14 DIAGNOSIS — Z30.41 ENCOUNTER FOR SURVEILLANCE OF CONTRACEPTIVE PILLS: ICD-10-CM

## 2018-08-14 DIAGNOSIS — F34.1 DYSTHYMIC DISORDER: ICD-10-CM

## 2018-08-14 DIAGNOSIS — N92.6 MENSTRUAL CHANGES: ICD-10-CM

## 2018-08-14 DIAGNOSIS — R53.83 FATIGUE, UNSPECIFIED TYPE: Primary | ICD-10-CM

## 2018-08-14 DIAGNOSIS — F33.0 MAJOR DEPRESSIVE DISORDER, RECURRENT EPISODE, MILD (H): ICD-10-CM

## 2018-08-14 LAB
ALBUMIN SERPL-MCNC: 3.7 G/DL (ref 3.4–5)
ALP SERPL-CCNC: 66 U/L (ref 40–150)
ALT SERPL W P-5'-P-CCNC: 26 U/L (ref 0–50)
ANION GAP SERPL CALCULATED.3IONS-SCNC: 7 MMOL/L (ref 3–14)
AST SERPL W P-5'-P-CCNC: 20 U/L (ref 0–45)
BASOPHILS # BLD AUTO: 0 10E9/L (ref 0–0.2)
BASOPHILS NFR BLD AUTO: 0.2 %
BILIRUB SERPL-MCNC: 0.2 MG/DL (ref 0.2–1.3)
BUN SERPL-MCNC: 12 MG/DL (ref 7–30)
CALCIUM SERPL-MCNC: 8.6 MG/DL (ref 8.5–10.1)
CHLORIDE SERPL-SCNC: 106 MMOL/L (ref 94–109)
CO2 SERPL-SCNC: 26 MMOL/L (ref 20–32)
CREAT SERPL-MCNC: 0.81 MG/DL (ref 0.52–1.04)
DEPRECATED CALCIDIOL+CALCIFEROL SERPL-MC: 52 UG/L (ref 20–75)
DIFFERENTIAL METHOD BLD: NORMAL
EOSINOPHIL # BLD AUTO: 0.1 10E9/L (ref 0–0.7)
EOSINOPHIL NFR BLD AUTO: 1.5 %
ERYTHROCYTE [DISTWIDTH] IN BLOOD BY AUTOMATED COUNT: 13.6 % (ref 10–15)
FERRITIN SERPL-MCNC: 5 NG/ML (ref 12–150)
FOLATE SERPL-MCNC: 34.3 NG/ML
GFR SERPL CREATININE-BSD FRML MDRD: 80 ML/MIN/1.7M2
GLUCOSE SERPL-MCNC: 82 MG/DL (ref 70–99)
HCT VFR BLD AUTO: 38.2 % (ref 35–47)
HGB BLD-MCNC: 12.4 G/DL (ref 11.7–15.7)
IRON SATN MFR SERPL: 7 % (ref 15–46)
IRON SERPL-MCNC: 28 UG/DL (ref 35–180)
LYMPHOCYTES # BLD AUTO: 2.6 10E9/L (ref 0.8–5.3)
LYMPHOCYTES NFR BLD AUTO: 48.7 %
MCH RBC QN AUTO: 27.8 PG (ref 26.5–33)
MCHC RBC AUTO-ENTMCNC: 32.5 G/DL (ref 31.5–36.5)
MCV RBC AUTO: 86 FL (ref 78–100)
MONOCYTES # BLD AUTO: 0.6 10E9/L (ref 0–1.3)
MONOCYTES NFR BLD AUTO: 10.7 %
NEUTROPHILS # BLD AUTO: 2.1 10E9/L (ref 1.6–8.3)
NEUTROPHILS NFR BLD AUTO: 38.9 %
PLATELET # BLD AUTO: 257 10E9/L (ref 150–450)
POTASSIUM SERPL-SCNC: 4 MMOL/L (ref 3.4–5.3)
PROT SERPL-MCNC: 7.2 G/DL (ref 6.8–8.8)
RBC # BLD AUTO: 4.46 10E12/L (ref 3.8–5.2)
SODIUM SERPL-SCNC: 139 MMOL/L (ref 133–144)
TIBC SERPL-MCNC: 425 UG/DL (ref 240–430)
TSH SERPL DL<=0.005 MIU/L-ACNC: 2.87 MU/L (ref 0.4–4)
VIT B12 SERPL-MCNC: 298 PG/ML (ref 193–986)
WBC # BLD AUTO: 5.4 10E9/L (ref 4–11)

## 2018-08-14 PROCEDURE — 82746 ASSAY OF FOLIC ACID SERUM: CPT | Performed by: FAMILY MEDICINE

## 2018-08-14 PROCEDURE — 82607 VITAMIN B-12: CPT | Performed by: FAMILY MEDICINE

## 2018-08-14 PROCEDURE — 36415 COLL VENOUS BLD VENIPUNCTURE: CPT | Performed by: FAMILY MEDICINE

## 2018-08-14 PROCEDURE — 99214 OFFICE O/P EST MOD 30 MIN: CPT | Performed by: FAMILY MEDICINE

## 2018-08-14 PROCEDURE — 84443 ASSAY THYROID STIM HORMONE: CPT | Performed by: FAMILY MEDICINE

## 2018-08-14 PROCEDURE — 83540 ASSAY OF IRON: CPT | Performed by: FAMILY MEDICINE

## 2018-08-14 PROCEDURE — 85025 COMPLETE CBC W/AUTO DIFF WBC: CPT | Performed by: FAMILY MEDICINE

## 2018-08-14 PROCEDURE — 82728 ASSAY OF FERRITIN: CPT | Performed by: FAMILY MEDICINE

## 2018-08-14 PROCEDURE — 80053 COMPREHEN METABOLIC PANEL: CPT | Performed by: FAMILY MEDICINE

## 2018-08-14 PROCEDURE — 82306 VITAMIN D 25 HYDROXY: CPT | Performed by: FAMILY MEDICINE

## 2018-08-14 PROCEDURE — 83550 IRON BINDING TEST: CPT | Performed by: FAMILY MEDICINE

## 2018-08-14 RX ORDER — LEVONORGESTREL/ETHIN.ESTRADIOL 0.1-0.02MG
1 TABLET ORAL DAILY
Qty: 84 TABLET | Refills: 3 | Status: SHIPPED | OUTPATIENT
Start: 2018-08-14 | End: 2019-09-17

## 2018-08-14 RX ORDER — SERTRALINE HYDROCHLORIDE 100 MG/1
TABLET, FILM COATED ORAL
Qty: 90 TABLET | Refills: 3 | Status: SHIPPED | OUTPATIENT
Start: 2018-08-14 | End: 2019-08-08

## 2018-08-14 ASSESSMENT — ANXIETY QUESTIONNAIRES
6. BECOMING EASILY ANNOYED OR IRRITABLE: NOT AT ALL
IF YOU CHECKED OFF ANY PROBLEMS ON THIS QUESTIONNAIRE, HOW DIFFICULT HAVE THESE PROBLEMS MADE IT FOR YOU TO DO YOUR WORK, TAKE CARE OF THINGS AT HOME, OR GET ALONG WITH OTHER PEOPLE: NOT DIFFICULT AT ALL
GAD7 TOTAL SCORE: 1
1. FEELING NERVOUS, ANXIOUS, OR ON EDGE: NOT AT ALL
3. WORRYING TOO MUCH ABOUT DIFFERENT THINGS: NOT AT ALL
7. FEELING AFRAID AS IF SOMETHING AWFUL MIGHT HAPPEN: NOT AT ALL
5. BEING SO RESTLESS THAT IT IS HARD TO SIT STILL: NOT AT ALL
2. NOT BEING ABLE TO STOP OR CONTROL WORRYING: SEVERAL DAYS

## 2018-08-14 ASSESSMENT — PATIENT HEALTH QUESTIONNAIRE - PHQ9: 5. POOR APPETITE OR OVEREATING: NOT AT ALL

## 2018-08-14 NOTE — PATIENT INSTRUCTIONS
1) We will do some labs today and I will let you know the results in the next two weeks.   2) Schedule with Jan Hunter for your mood. You can discuss a plan for longer term therapy for your mood if needed.   3) If your labs are normal, I recommend you follow up with me if you are noticing new symptoms or worsening fatigue.   Managing Fatigue     Family members can help with meals and chores around the house.   Fatigue is common. It can be caused by worry, lack of sleep, or poor appetite. Fatigue can also be a sign of anemia, a shortage of red blood cells. You might need medical treatment for anemia. The tips below can help you feel better.  Conserving energy    Keep track of the times of day when you are most tired and plan around them. For instance, if you are more tired in the afternoon, try to get tasks done in the morning.    Decide which tasks are most important. Do those first.    Pass tasks along to others when you need to. Ask for help.    Accept help when it s offered. Tell people what they can do to help. For instance, you may need someone to fix a meal, fold clothes, or put gas in your car.    Plan rest times. You may want to take a nap each day. Just sitting quietly for a few minutes can make you feel more rested.  What you can do to feel better    Relax before you try to sleep. Take a bath or read for a while.    Form a sleep pattern. Go to bed at the same time each night and get up at the same time each morning.    Eat well. Choose foods from all of the food groups each day.    Exercise. Take a brisk walk to help increase your energy.    Avoid caffeine and alcohol. Drink plenty of water or fruit juices instead.  Treating anemia  If you begin to feel more tired than normal, tell your doctor. Fatigue could be a sign of anemia. This problem is fairly common in cancer patients, especially during chemotherapy and radiation treatments. If your red blood cell count is too low, you may get a blood  transfusion. In some cases, you may need medicine to increase the number of red blood cells your body makes.  When to call your healthcare provider  Call your healthcare provider if you have:    Shortness of breath or chest pain    A dizzy feeling when you get up from lying or sitting down    Paler skin than normal    Extreme tiredness that is not helped by sleep   Date Last Reviewed: 1/3/2016    5327-6154 [employer]. [address] [place] [address], [place] [id number]. All rights reserved. This information is not intended as a substitute for professional medical care. Always follow your healthcare professional's instructions.

## 2018-08-14 NOTE — PROGRESS NOTES
SUBJECTIVE:   Seema Navas is a 33 year old female who presents to clinic today for the following health issues:      Fatigue    Onset: 2 week(s) ago   How long have you felt fatigued: 2 week(s) ago                                                     Description:  Description of activities and lifestyle: no energy to go to the gym; work at desk job,  Schedule and responsibilities:  finding a hard time getting to work on time.   How much sleep are you gettin-9 hours at a time  Daily exercise: no regular exercise program  Are there episodes of normal energy levels: yes    Accompanying Signs & Symptoms:   Period have been light   Falling asleep during the day: no  Snoring: no  Do you stop breathing while sleeping: no                 Night sweats: YES  Chest Pain: no  History of Alcohol/drug abuse:no  History of Depression: YES  Any new anxiety/stressors:no  Abdominal pain: no   Change in appetite: no                 Weight gain/loss: gain over the pass years  Dark or bloody stools: no    Therapies tried and outcome: multivitamin  with no relief        She stopped eating meat in 2016 and has reported fatigue since that time. Worse fatigue the past couple of weeks.   Life has been stressful. She continues to feel somewhat depressed. She has begun to pursue scheduling with therapist, but those she has called are not taking new patients. Feels sweaty at night, no fevers.  This is also side effect of sertraline.  She falls asleep fine.  Denies snoring or known apneic episodes restless legs.  She is able to awaken with her alarm in the morning, however she turns it often goes back to sleep sometimes.  She sometimes gets to work late.  She continues on OCPs. She has had periods that are just 1-2 days. This is shorter than usual for her. Gets no cramps while on the pill. Does when not taking. Has felt she has done well with current OCP, just wondering if change in her period is normal.    She needs refill of  "sertraline, taking 100mg daily.     From 10/10/17 clinic visit:  \"Fatigue - She is dealing with fatigue. Since she stopped eating meat a year ago she feels it is more pronounced. She is curious if this is related to her nutrition and if her diet is adequate. She does not take Vitamin B12 or iron supplements.         Depression - Her depression is stable on Zoloft 100 mg. She thinks her mood could possibly be contributing to her decreased energy level. She was on the 150 mg dose of Zoloft in the past and did not notice a difference.  She forgets what she was going to do or say, and it takes longer for her to retrieve these thoughts.      Headaches - Patient feels her headaches may be more frequent. No report of severe headaches. This is longstanding. Denies vision changes.     Pain with intercourse - She continues to have pain with intercourse, which is unchanged. Denies concern for STD.     She does not want to discuss the above in any more detail today. She agrees to come back if persistent symptoms. \"       Problem list and histories reviewed & adjusted, as indicated.  Additional history: as documented    Patient Active Problem List   Diagnosis     CARDIOVASCULAR SCREENING; LDL GOAL LESS THAN 160     CHEYENNE II (cervical intraepithelial neoplasia II)     Major depression     Dysthymic disorder     Past Surgical History:   Procedure Laterality Date     HC TOOTH EXTRACTION W/FORCEP  Jan 2010     NO HISTORY OF SURGERY         Social History   Substance Use Topics     Smoking status: Never Smoker     Smokeless tobacco: Never Used     Alcohol use 0.5 - 1.0 oz/week     1 - 2 Standard drinks or equivalent per week      Comment: 2 week      Family History   Problem Relation Age of Onset     Allergies Mother      bees         Current Outpatient Prescriptions   Medication Sig Dispense Refill     Cholecalciferol (VITAMIN D) 2000 UNITS tablet Take 2,000 Units by mouth daily 100 tablet 0     levonorgestrel-ethinyl estradiol " "(AVIANE) 0.1-20 MG-MCG per tablet Take 1 tablet by mouth daily 1 TABLET DAILY 84 tablet 3     order for DME Equipment being ordered: Therapy Light for Seasonal affective disorder 1 Device 0     sertraline (ZOLOFT) 100 MG tablet Take one tablet by mouth one time daily. 30 tablet 0     Allergies   Allergen Reactions     No Known Drug Allergies      Recent Labs   Lab Test  02/26/16   0949  06/21/11   0912   LDL   --   72   HDL   --   72   TRIG   --   87   CR  0.84   --    GFRESTIMATED  78   --    GFRESTBLACK  >90   GFR Calc     --    POTASSIUM  4.3   --       BP Readings from Last 3 Encounters:   08/14/18 112/74   10/10/17 109/67   07/25/17 104/64    Wt Readings from Last 3 Encounters:   08/14/18 138 lb 8 oz (62.8 kg)   10/10/17 140 lb (63.5 kg)   07/25/17 136 lb 4 oz (61.8 kg)            Reviewed and updated as needed this visit by clinical staff  Tobacco  Allergies  Meds  Med Hx  Surg Hx  Fam Hx  Soc Hx      Reviewed and updated as needed this visit by Provider         ROS:   ROS: 10 point ROS neg other than the symptoms noted above in the HPI.     OBJECTIVE:     /74 (BP Location: Right arm, Patient Position: Chair, Cuff Size: Adult Regular)  Pulse 69  Temp 98.7  F (37.1  C) (Oral)  Resp 15  Ht 5' 4\" (1.626 m)  Wt 138 lb 8 oz (62.8 kg)  SpO2 98%  BMI 23.77 kg/m2  Body mass index is 23.77 kg/(m^2).  /74 (BP Location: Right arm, Patient Position: Chair, Cuff Size: Adult Regular)  Pulse 69  Temp 98.7  F (37.1  C) (Oral)  Resp 15  Ht 5' 4\" (1.626 m)  Wt 138 lb 8 oz (62.8 kg)  SpO2 98%  BMI 23.77 kg/m2    Gen: NAD, appears stated age  Eyes: anicteric, moist conjunctivae without lid lag; PERRL  ENT: OP normal and good dentition  NECK: No masses. No thyromegaly appreciated.  Lungs: clear to auscultation with normal respiratory effort  Heart: RRR, no MRGs. No lower extremity edema  Abomen: soft, nontender, no appreciable HSM  Skin: Skin is warm and dry on palpation. No rash, " lesions or ulcers are seen.   Psych: A and O x 3 with appropriate affect.      Diagnostic Test Results:  Results for orders placed or performed in visit on 08/14/18 (from the past 24 hour(s))   CBC with platelets differential   Result Value Ref Range    WBC 5.4 4.0 - 11.0 10e9/L    RBC Count 4.46 3.8 - 5.2 10e12/L    Hemoglobin 12.4 11.7 - 15.7 g/dL    Hematocrit 38.2 35.0 - 47.0 %    MCV 86 78 - 100 fl    MCH 27.8 26.5 - 33.0 pg    MCHC 32.5 31.5 - 36.5 g/dL    RDW 13.6 10.0 - 15.0 %    Platelet Count 257 150 - 450 10e9/L    Diff Method Automated Method     % Neutrophils 38.9 %    % Lymphocytes 48.7 %    % Monocytes 10.7 %    % Eosinophils 1.5 %    % Basophils 0.2 %    Absolute Neutrophil 2.1 1.6 - 8.3 10e9/L    Absolute Lymphocytes 2.6 0.8 - 5.3 10e9/L    Absolute Monocytes 0.6 0.0 - 1.3 10e9/L    Absolute Eosinophils 0.1 0.0 - 0.7 10e9/L    Absolute Basophils 0.0 0.0 - 0.2 10e9/L       ASSESSMENT/PLAN:          1. Fatigue, unspecified type  unclear etiology with uncertain prognosis, requires further workup    Suspect depression is contributing. She continues sertraline and feels this helps, but is willing to schedule therapy as well. Will check labs today.   - CBC with platelets differential  - TSH with free T4 reflex  - Comprehensive metabolic panel  - Vitamin D Deficiency  - Ferritin  - Iron and iron binding capacity  - Vitamin B12  - Folate    2. Menstrual changes  [place] periods. Continues OCP. No concerning changes at this time. Checking TSH today     3. Dysthymic disorder  4. Major depressive disorder, recurrent episode, mild (H)   as noted above, will also schedule with Jan Hunter   - sertraline (ZOLOFT) 100 MG tablet; Take one tablet by mouth one time daily.  Dispense: 90 tablet; Refill: 3    5. Encounter for surveillance of contraceptive pills     - levonorgestrel-ethinyl estradiol (AVIANE) 0.1-20 MG-MCG per tablet; Take 1 tablet by mouth daily 1 TABLET DAILY  Dispense: 84 tablet; Refill:  3    Patient Instructions     1) We will do some labs today and I will let you know the results in the next two weeks.   2) Schedule with Jan Hunter for your mood. You can discuss a plan for longer term therapy for your mood if needed.   3) If your labs are normal, I recommend you follow up with me if you are noticing new symptoms or worsening fatigue.   Managing Fatigue     Family members can help with meals and chores around the house.   Fatigue is common. It can be caused by worry, lack of sleep, or poor appetite. Fatigue can also be a sign of anemia, a shortage of red blood cells. You might need medical treatment for anemia. The tips below can help you feel better.  Conserving energy    Keep track of the times of day when you are most tired and plan around them. For instance, if you are more tired in the afternoon, try to get tasks done in the morning.    Decide which tasks are most important. Do those first.    Pass tasks along to others when you need to. Ask for help.    Accept help when it s offered. Tell people what they can do to help. For instance, you may need someone to fix a meal, fold clothes, or put gas in your car.    Plan rest times. You may want to take a nap each day. Just sitting quietly for a few minutes can make you feel more rested.  What you can do to feel better    Relax before you try to sleep. Take a bath or read for a while.    Form a sleep pattern. Go to bed at the same time each night and get up at the same time each morning.    Eat well. Choose foods from all of the food groups each day.    Exercise. Take a brisk walk to help increase your energy.    Avoid caffeine and alcohol. Drink plenty of water or fruit juices instead.  Treating anemia  If you begin to feel more tired than normal, tell your doctor. Fatigue could be a sign of anemia. This problem is fairly common in cancer patients, especially during chemotherapy and radiation treatments. If your red blood cell count is too low,  you may get a blood transfusion. In some cases, you may need medicine to increase the number of red blood cells your body makes.  When to call your healthcare provider  Call your healthcare provider if you have:    Shortness of breath or chest pain    A dizzy feeling when you get up from lying or sitting down    Paler skin than normal    Extreme tiredness that is not helped by sleep   Date Last Reviewed: 1/3/2016    4075-6763 [employer]. [address] [place] [address], [place] [id number]. All rights reserved. This information is not intended as a substitute for professional medical care. Always follow your healthcare professional's instructions.            Alondra Tatum MD  [facility]

## 2018-08-14 NOTE — MR AVS SNAPSHOT
After Visit Summary   8/14/2018    Seema Navas    MRN: [id number]           Patient Information     Date Of Birth          1985        Visit Information        Provider Department      8/14/2018 8:20 AM Alondra Tatum MD [facility]        Today's Diagnoses     Fatigue, unspecified type    -  1    Menstrual changes        Dysthymic disorder          Care Instructions    1) We will do some labs today and I will let you know the results in the next two weeks.   2) Schedule with Jan Hunter for your mood. You can discuss a plan for longer term therapy for your mood if needed.   3) If your labs are normal, I recommend you follow up with me if you are noticing new symptoms or worsening fatigue.   Managing Fatigue     Family members can help with meals and chores around the house.   Fatigue is common. It can be caused by worry, lack of sleep, or poor appetite. Fatigue can also be a sign of anemia, a shortage of red blood cells. You might need medical treatment for anemia. The tips below can help you feel better.  Conserving energy    Keep track of the times of day when you are most tired and plan around them. For instance, if you are more tired in the afternoon, try to get tasks done in the morning.    Decide which tasks are most important. Do those first.    Pass tasks along to others when you need to. Ask for help.    Accept help when it s offered. Tell people what they can do to help. For instance, you may need someone to fix a meal, fold clothes, or put gas in your car.    Plan rest times. You may want to take a nap each day. Just sitting quietly for a few minutes can make you feel more rested.  What you can do to feel better    Relax before you try to sleep. Take a bath or read for a while.    Form a sleep pattern. Go to bed at the same time each night and get up at the same time each morning.    Eat well. Choose foods from all of the food groups each day.    Exercise. Take a  brisk walk to help increase your energy.    Avoid caffeine and alcohol. Drink plenty of water or fruit juices instead.  Treating anemia  If you begin to feel more tired than normal, tell your doctor. Fatigue could be a sign of anemia. This problem is fairly common in cancer patients, especially during chemotherapy and radiation treatments. If your red blood cell count is too low, you may get a blood transfusion. In some cases, you may need medicine to increase the number of red blood cells your body makes.  When to call your healthcare provider  Call your healthcare provider if you have:    Shortness of breath or chest pain    A dizzy feeling when you get up from lying or sitting down    Paler skin than normal    Extreme tiredness that is not helped by sleep   Date Last Reviewed: 1/3/2016    3021-1192 [employer]. [address] [place] [address]. All rights reserved. This information is not intended as a substitute for professional medical care. Always follow your healthcare professional's instructions.                Follow-ups after your visit        Who to contact     If you have questions or need follow up information about today's clinic visit or your schedule please contact [facility] directly at [phone].  Normal or non-critical lab and imaging results will be communicated to you by "[employer]"hart, letter or phone within 4 business days after the clinic has received the results. If you do not hear from us within 7 days, please contact the clinic through "[employer]"hart or phone. If you have a critical or abnormal lab result, we will notify you by phone as soon as possible.  Submit refill requests through [employer] or call your pharmacy and they will forward the refill request to us. Please allow 3 business days for your refill to be completed.          Additional Information About Your Visit        [employer] Information     [employer] gives you secure access to your electronic health  "record. If you see a primary care provider, you can also send messages to your care team and make appointments. If you have questions, please call your primary care clinic.  If you do not have a primary care provider, please call [phone] and they will assist you.        Care EveryWhere ID     This is your Care EveryWhere ID. This could be used by other organizations to access your [facility] medical records  [id number]        Your Vitals Were     Pulse Temperature Respirations Height Pulse Oximetry BMI (Body Mass Index)    69 98.7  F (37.1  C) (Oral) 15 5' 4\" (1.626 m) 98% 23.77 kg/m2       Blood Pressure from Last 3 Encounters:   08/14/18 112/74   10/10/17 109/67   07/25/17 104/64    Weight from Last 3 Encounters:   08/14/18 138 lb 8 oz (62.8 kg)   10/10/17 140 lb (63.5 kg)   07/25/17 136 lb 4 oz (61.8 kg)              We Performed the Following     CBC with platelets differential     Comprehensive metabolic panel     Ferritin     Folate     Iron and iron binding capacity     TSH with free T4 reflex     Vitamin B12     Vitamin D Deficiency        Primary Care Provider Office Phone # Fax #    Alondra Tatum [phone]       [address] [place] [id number]        Equal Access to Services     ALAN GAINES : Hadii jimi ku hadasho Soomaali, waaxda luqadaha, qaybta kaalmada adeegyada, sfoia buenrostro. So [facility] [phone].    ATENCIÓN: Si habla español, tiene a cortes disposición servicios gratuitos de asistencia lingüística. Llame al [phone].    We comply with applicable federal civil rights laws and [place] laws. We do not discriminate on the basis of race, color, national origin, age, disability, sex, sexual orientation, or gender identity.            Thank you!     Thank you for choosing [facility]  for your care. Our goal is always to provide you with excellent care. Hearing back from our patients is one way we can continue to improve our " services. Please take a few minutes to complete the written survey that you may receive in the mail after your visit with us. Thank you!             Your Updated Medication List - Protect others around you: Learn how to safely use, store and throw away your medicines at www.disposemymeds.org.          This list is accurate as of 8/14/18  9:16 AM.  Always use your most recent med list.                   Brand Name Dispense Instructions for use Diagnosis    levonorgestrel-ethinyl estradiol 0.1-20 MG-MCG per tablet    AVIANE    84 tablet    Take 1 tablet by mouth daily 1 TABLET DAILY    Encounter for surveillance of contraceptive pills       order for DME     1 Device    Equipment being ordered: Therapy Light for Seasonal affective disorder    Major depression       sertraline 100 MG tablet    ZOLOFT    30 tablet    Take one tablet by mouth one time daily.    Major depressive disorder, recurrent episode, mild (H)       vitamin D 2000 units tablet     100 tablet    Take 2,000 Units by mouth daily    Unspecified vitamin deficiency

## 2018-08-15 ASSESSMENT — ANXIETY QUESTIONNAIRES: GAD7 TOTAL SCORE: 1

## 2018-08-15 ASSESSMENT — PATIENT HEALTH QUESTIONNAIRE - PHQ9: SUM OF ALL RESPONSES TO PHQ QUESTIONS 1-9: 8

## 2019-08-08 DIAGNOSIS — F33.0 MAJOR DEPRESSIVE DISORDER, RECURRENT EPISODE, MILD (H): ICD-10-CM

## 2019-08-08 RX ORDER — SERTRALINE HYDROCHLORIDE 100 MG/1
TABLET, FILM COATED ORAL
Qty: 30 TABLET | Refills: 0 | Status: SHIPPED | OUTPATIENT
Start: 2019-08-08 | End: 2019-09-09

## 2019-08-08 NOTE — TELEPHONE ENCOUNTER
"Requested Prescriptions   Pending Prescriptions Disp Refills     sertraline (ZOLOFT) 100 MG tablet 90 tablet 3     Sig: Take one tablet by mouth one time daily.  Last Written Prescription Date:  8/14/2018  Last Fill Quantity: 90 tablet,  # refills: 3   Last Office Visit: 8/14/2018   Future Office Visit:            SSRIs Protocol Failed - 8/8/2019 11:14 AM        Failed - PHQ-9 score less than 5 in past 6 months     Please review last PHQ-9 score.   PHQ-9 SCORE 2/14/2018 8/14/2018 9/4/2018   PHQ-9 Total Score - - -   PHQ-9 Total Score MyChart - - 8 (Mild depression)   PHQ-9 Total Score 1 8 8     MARIELA-7 SCORE 7/25/2017 8/14/2018 9/4/2018   Total Score - - 7 (mild anxiety)   Total Score 2 1 7           Failed - Recent (6 mo) or future (30 days) visit within the authorizing provider's specialty     Patient had office visit in the last 6 months or has a visit in the next 30 days with authorizing provider or within the authorizing provider's specialty.  See \"Patient Info\" tab in inbasket, or \"Choose Columns\" in Meds & Orders section of the refill encounter.            Passed - Medication is active on med list        Passed - Patient is age 18 or older        Passed - No active pregnancy on record        Passed - No positive pregnancy test in last 12 months          "

## 2019-08-09 NOTE — TELEPHONE ENCOUNTER
Medication is being filled for 1 time refill only due to:  Patient needs to be seen because it has been more than one year since last visit.     Signed Prescriptions:                        Disp   Refills    sertraline (ZOLOFT) 100 MG tablet          30 tab*0        Sig: Take one tablet by mouth one time daily.  Authorizing Provider: WASHINGTON HUGHES  Ordering User: PHILOMENA VILLAFUERTE Reception - one month has refills until 8/14 and then due for annual office visit

## 2019-08-12 NOTE — TELEPHONE ENCOUNTER
Attempt to contact to inquire of below.No answer. Left message to return call to clinic.   Sent [employer] message

## 2019-09-09 DIAGNOSIS — F33.0 MAJOR DEPRESSIVE DISORDER, RECURRENT EPISODE, MILD (H): ICD-10-CM

## 2019-09-09 NOTE — TELEPHONE ENCOUNTER
"Requested Prescriptions   Pending Prescriptions Disp Refills     sertraline (ZOLOFT) 100 MG tablet [Pharmacy Med Name: SERTRALINE  MG TABLET] 30 tablet 0     Sig: TAKE 1 TABLET BY MOUTH EVERY DAY, NEED APPT FOR FURTHER REFILLS  Last Written Prescription Date:  8/8/2019  Last Fill Quantity: 30 tablet,  # refills: 0   Last Office Visit: 8/14/2018   Future Office Visit:    Next 5 appointments (look out 90 days)    Sep 17, 2019  8:40 AM CDT  PHYSICAL with Alondra Tatum MD  [facility] ([facility]) [address] [place] [id number] [phone]              SSRIs Protocol Failed - 9/9/2019  9:33 AM        Failed - PHQ-9 score less than 5 in past 6 months     Please review last PHQ-9 score.   PHQ-9 SCORE 2/14/2018 8/14/2018 9/4/2018   PHQ-9 Total Score - - -   PHQ-9 Total Score MyChart - - 8 (Mild depression)   PHQ-9 Total Score 1 8 8     MARIELA-7 SCORE 7/25/2017 8/14/2018 9/4/2018   Total Score - - 7 (mild anxiety)   Total Score 2 1 7           Passed - Medication is active on med list        Passed - Patient is age 18 or older        Passed - No active pregnancy on record        Passed - No positive pregnancy test in last 12 months        Passed - Recent (6 mo) or future (30 days) visit within the authorizing provider's specialty     Patient had office visit in the last 6 months or has a visit in the next 30 days with authorizing provider or within the authorizing provider's specialty.  See \"Patient Info\" tab in inbasket, or \"Choose Columns\" in Meds & Orders section of the refill encounter.              "

## 2019-09-11 RX ORDER — SERTRALINE HYDROCHLORIDE 100 MG/1
TABLET, FILM COATED ORAL
Qty: 30 TABLET | Refills: 0 | Status: SHIPPED | OUTPATIENT
Start: 2019-09-11 | End: 2019-09-17

## 2019-09-16 NOTE — PROGRESS NOTES
SUBJECTIVE:   CC: Seema Navas is an 34 year old woman who presents for preventive health visit.     Healthy Habits:     Getting at least 3 servings of Calcium per day:  NO    Bi-annual eye exam:  Yes    Dental care twice a year:  Yes    Sleep apnea or symptoms of sleep apnea:  None    Diet:  Vegetarian/vegan    Frequency of exercise:  1 day/week    Duration of exercise:  15-30 minutes    Taking medications regularly:  Yes    Medication side effects:  Not applicable    PHQ-2 Total Score: 2    Additional concerns today:  No    Answers for HPI/ROS submitted by the patient on 9/17/2019   Annual Exam:  If you checked off any problems, how difficult have these problems made it for you to do your work, take care of things at home, or get along with other people?: Somewhat difficult  PHQ9 TOTAL SCORE: 7  MARIELA 7 TOTAL SCORE: 0    Today's PHQ-2 Score:   PHQ-2 ( 1999 [employer]) 9/17/2019   Q1: Little interest or pleasure in doing things 1   Q2: Feeling down, depressed or hopeless 1   PHQ-2 Score 2   Q1: Little interest or pleasure in doing things Several days   Q2: Feeling down, depressed or hopeless Several days   PHQ-2 Score 2       Abuse: Current or Past(Physical, Sexual or Emotional)- No  Do you feel safe in your environment? Yes    Social History     Tobacco Use     Smoking status: Never Smoker     Smokeless tobacco: Never Used   Substance Use Topics     Alcohol use: Yes     Alcohol/week: 0.5 - 1.0 oz     Comment: minimal     If you drink alcohol do you typically have >3 drinks per day or >7 drinks per week? No    Alcohol Use 9/17/2019   Prescreen: >3 drinks/day or >7 drinks/week? No   Prescreen: >3 drinks/day or >7 drinks/week? -   No flowsheet data found.    Reviewed orders with patient.  Reviewed health maintenance and updated orders accordingly - Yes  BP Readings from Last 3 Encounters:   09/17/19 118/72   08/14/18 112/74   10/10/17 109/67    Wt Readings from Last 3 Encounters:   09/17/19 65.1 kg (143 lb 9 oz)    08/14/18 62.8 kg (138 lb 8 oz)   10/10/17 63.5 kg (140 lb)                  Patient Active Problem List   Diagnosis     CARDIOVASCULAR SCREENING; LDL GOAL LESS THAN 160     CHEYENNE II (cervical intraepithelial neoplasia II)     Major depression     Dysthymic disorder     Past Surgical History:   Procedure Laterality Date     HC TOOTH EXTRACTION W/FORCEP  Jan 2010     NO HISTORY OF SURGERY         Social History     Tobacco Use     Smoking status: Never Smoker     Smokeless tobacco: Never Used   Substance Use Topics     Alcohol use: Yes     Alcohol/week: 0.5 - 1.0 oz     Comment: minimal     Family History   Problem Relation Age of Onset     Allergies Mother         bees     Hypertension Mother          Current Outpatient Medications   Medication Sig Dispense Refill     Cholecalciferol (VITAMIN D) 2000 UNITS tablet Take 2,000 Units by mouth daily 100 tablet 0     levonorgestrel-ethinyl estradiol (AVIANE) 0.1-20 MG-MCG tablet Take 1 tablet by mouth daily 1 TABLET DAILY 84 tablet 3     order for DME Equipment being ordered: Therapy Light for Seasonal affective disorder 1 Device 0     sertraline (ZOLOFT) 100 MG tablet TAKE 1 TABLET BY MOUTH EVERY DAY, NEED APPT FOR FURTHER REFILLS 30 tablet 0     Allergies   Allergen Reactions     No Known Drug Allergies      Recent Labs   Lab Test 08/14/18  0919 02/26/16  0949   ALT 26  --    CR 0.81 0.84   GFRESTIMATED 80 78   GFRESTBLACK >90 >90  African American GFR Calc     POTASSIUM 4.0 4.3   TSH 2.87  --         Mammogram not appropriate for this patient based on age.    Pertinent mammograms are reviewed under the imaging tab.  H/o CHEYENNE II (cervical intraepithelial neoplasia II) last 3 paps normal  PAP / HPV Latest Ref Rng & Units 10/10/2017 11/10/2014 10/21/2013   PAP - NIL NIL NIL   HPV 16 DNA NEG:Negative Negative - -   HPV 18 DNA NEG:Negative Negative - -   OTHER HR HPV NEG:Negative Negative - -     Reviewed and updated as needed this visit by clinical staff  Tobacco  Allergies  " Meds         Reviewed and updated as needed this visit by Provider        Past Medical History:   Diagnosis Date     Depressive disorder 2007     Lumbago     xray of spine in 6/10 - degenerative disk     Mild major depression (H) 2011    Managed by psychiatry      Papanicolaou smear of cervix with low grade squamous intraepithelial lesion (LGSIL) 6/11/10, 3/15/11    CHEYENNE I & II      Past Surgical History:   Procedure Laterality Date     HC TOOTH EXTRACTION W/FORCEP  2010     NO HISTORY OF SURGERY       OB History    Para Term  AB Living   0 0 0 0 0 0   SAB TAB Ectopic Multiple Live Births   0 0 0 0 0       Review of Systems   Constitutional: Negative for chills and fever.   HENT: Negative for congestion, ear pain, hearing loss and sore throat.    Eyes: Negative for pain and visual disturbance.   Respiratory: Negative for cough and shortness of breath.    Cardiovascular: Negative for chest pain, palpitations and peripheral edema.   Gastrointestinal: Negative for abdominal pain, constipation, diarrhea, heartburn, hematochezia and nausea.   Breasts:  Negative for tenderness, breast mass and discharge.   Genitourinary: Negative for dysuria, frequency, genital sores, hematuria, pelvic pain, urgency, vaginal bleeding and vaginal discharge.   Musculoskeletal: Negative for arthralgias, joint swelling and myalgias.   Skin: Negative for rash.   Neurological: Negative for dizziness, weakness, headaches and paresthesias.   Psychiatric/Behavioral: Negative for mood changes. The patient is not nervous/anxious.       From 2018 clinic visit:  \"She stopped eating meat in 2016 and has reported fatigue since that time. Worse fatigue the past couple of weeks.   Life has been stressful. She continues to feel somewhat depressed. She has begun to pursue scheduling with therapist, but those she has called are not taking new patients. Feels sweaty at night, no fevers.  This is also side effect of sertraline.  " "She falls asleep fine.  Denies snoring or known apneic episodes restless legs.  She is able to awaken with her alarm in the morning, however she turns it often goes back to sleep sometimes.  She sometimes gets to work late.  She continues on OCPs. She has had periods that are just 1-2 days. This is shorter than usual for her. Gets no cramps while on the pill. Does when not taking. Has felt she has done well with current OCP, just wondering if change in her period is normal.    She needs refill of sertraline, taking 100mg daily.\"  OBJECTIVE:   /72 (BP Location: Left arm, Patient Position: Sitting, Cuff Size: Adult Regular)   Pulse 67   Temp 98.5  F (36.9  C) (Oral)   Ht 1.619 m (5' 3.75\")   Wt 65.1 kg (143 lb 9 oz)   LMP 08/26/2019 (Approximate)   SpO2 96%   BMI 24.84 kg/m     Wt Readings from Last 5 Encounters:   09/17/19 65.1 kg (143 lb 9 oz)   08/14/18 62.8 kg (138 lb 8 oz)   10/10/17 63.5 kg (140 lb)   07/25/17 61.8 kg (136 lb 4 oz)   12/22/16 61.7 kg (136 lb)      Physical Exam  GENERAL: healthy, alert and no distress  EYES: Eyes grossly normal to inspection, PERRL and conjunctivae and sclerae normal  HENT: ear canals and TM's normal, nose and mouth without ulcers or lesions  NECK: no adenopathy, no asymmetry, masses, or scars and thyroid normal to palpation  RESP: lungs clear to auscultation - no rales, rhonchi or wheezes  BREAST: normal without masses, tenderness or nipple discharge and no palpable axillary masses or adenopathy  CV: regular rate and rhythm, normal S1 S2, no S3 or S4, no murmur, click or rub, no peripheral edema and peripheral pulses strong  ABDOMEN: soft, nontender, no hepatosplenomegaly, no masses and bowel sounds normal  MS: no gross musculoskeletal defects noted, no edema  SKIN: no suspicious lesions or rashes  NEURO: Normal strength and tone, mentation intact and speech normal  PSYCH: mentation appears normal, affect normal/bright    Diagnostic Test Results:  Labs reviewed in " "Epic    ASSESSMENT/PLAN:     1. Routine general medical examination at a health care facility  PAP with cotesting due in 2020  Influenza vaccine recommended today    2. Episode of recurrent major depressive disorder, unspecified depression episode severity (H)  Stable. Continues on sertraline 100 mg daily.        3. Encounter for surveillance of contraceptive pills     - levonorgestrel-ethinyl estradiol (AVIANE) 0.1-20 MG-MCG per tablet; Take 1 tablet by mouth daily 1 TABLET DAILY  Dispense: 84 tablet; Refill: 3              COUNSELING:  Reviewed preventive health counseling, as reflected in patient instructions    Estimated body mass index is 24.84 kg/m  as calculated from the following:    Height as of this encounter: 1.619 m (5' 3.75\").    Weight as of this encounter: 65.1 kg (143 lb 9 oz).         reports that she has never smoked. She has never used smokeless tobacco.      Counseling Resources:  ATP IV Guidelines  Pooled Cohorts Equation Calculator  Breast Cancer Risk Calculator  FRAX Risk Assessment  ICSI Preventive Guidelines  Dietary Guidelines for Americans, 2010  USDA's MyPlate  ASA Prophylaxis  Lung CA Screening    Alondra Tatum MD, MD  [facility]  "

## 2019-09-16 NOTE — PATIENT INSTRUCTIONS

## 2019-09-17 ENCOUNTER — OFFICE VISIT (OUTPATIENT)
Dept: FAMILY MEDICINE | Facility: CLINIC | Age: 34
End: 2019-09-17
Payer: COMMERCIAL

## 2019-09-17 VITALS
BODY MASS INDEX: 24.51 KG/M2 | TEMPERATURE: 98.5 F | OXYGEN SATURATION: 96 % | HEIGHT: 64 IN | HEART RATE: 67 BPM | SYSTOLIC BLOOD PRESSURE: 118 MMHG | DIASTOLIC BLOOD PRESSURE: 72 MMHG | WEIGHT: 143.56 LBS

## 2019-09-17 DIAGNOSIS — Z23 NEED FOR PROPHYLACTIC VACCINATION AND INOCULATION AGAINST INFLUENZA: ICD-10-CM

## 2019-09-17 DIAGNOSIS — Z30.41 ENCOUNTER FOR SURVEILLANCE OF CONTRACEPTIVE PILLS: ICD-10-CM

## 2019-09-17 DIAGNOSIS — F33.0 MAJOR DEPRESSIVE DISORDER, RECURRENT EPISODE, MILD (H): ICD-10-CM

## 2019-09-17 DIAGNOSIS — Z00.00 ROUTINE GENERAL MEDICAL EXAMINATION AT A HEALTH CARE FACILITY: Primary | ICD-10-CM

## 2019-09-17 PROCEDURE — 90471 IMMUNIZATION ADMIN: CPT | Performed by: FAMILY MEDICINE

## 2019-09-17 PROCEDURE — 90686 IIV4 VACC NO PRSV 0.5 ML IM: CPT | Performed by: FAMILY MEDICINE

## 2019-09-17 PROCEDURE — 99395 PREV VISIT EST AGE 18-39: CPT | Mod: 25 | Performed by: FAMILY MEDICINE

## 2019-09-17 PROCEDURE — 96127 BRIEF EMOTIONAL/BEHAV ASSMT: CPT | Performed by: FAMILY MEDICINE

## 2019-09-17 RX ORDER — SERTRALINE HYDROCHLORIDE 100 MG/1
TABLET, FILM COATED ORAL
Qty: 90 TABLET | Refills: 3 | Status: SHIPPED | OUTPATIENT
Start: 2019-09-17 | End: 2020-09-04

## 2019-09-17 RX ORDER — LEVONORGESTREL/ETHIN.ESTRADIOL 0.1-0.02MG
1 TABLET ORAL DAILY
Qty: 84 TABLET | Refills: 3 | Status: SHIPPED | OUTPATIENT
Start: 2019-09-17 | End: 2020-06-22

## 2019-09-17 ASSESSMENT — ENCOUNTER SYMPTOMS
ABDOMINAL PAIN: 0
BREAST MASS: 0
JOINT SWELLING: 0
DIZZINESS: 0
ARTHRALGIAS: 0
FEVER: 0
MYALGIAS: 0
CONSTIPATION: 0
DIARRHEA: 0
WEAKNESS: 0
COUGH: 0
PALPITATIONS: 0
DYSURIA: 0
HEMATURIA: 0
CHILLS: 0
HEMATOCHEZIA: 0
PARESTHESIAS: 0
SORE THROAT: 0
FREQUENCY: 0
SHORTNESS OF BREATH: 0
HEADACHES: 0
HEARTBURN: 0
NERVOUS/ANXIOUS: 0
EYE PAIN: 0
NAUSEA: 0

## 2019-09-17 ASSESSMENT — ANXIETY QUESTIONNAIRES
7. FEELING AFRAID AS IF SOMETHING AWFUL MIGHT HAPPEN: NOT AT ALL
7. FEELING AFRAID AS IF SOMETHING AWFUL MIGHT HAPPEN: NOT AT ALL
GAD7 TOTAL SCORE: 0
3. WORRYING TOO MUCH ABOUT DIFFERENT THINGS: NOT AT ALL
2. NOT BEING ABLE TO STOP OR CONTROL WORRYING: NOT AT ALL
1. FEELING NERVOUS, ANXIOUS, OR ON EDGE: NOT AT ALL
5. BEING SO RESTLESS THAT IT IS HARD TO SIT STILL: NOT AT ALL
6. BECOMING EASILY ANNOYED OR IRRITABLE: NOT AT ALL
GAD7 TOTAL SCORE: 0
GAD7 TOTAL SCORE: 0
4. TROUBLE RELAXING: NOT AT ALL

## 2019-09-17 ASSESSMENT — PATIENT HEALTH QUESTIONNAIRE - PHQ9
SUM OF ALL RESPONSES TO PHQ QUESTIONS 1-9: 7
10. IF YOU CHECKED OFF ANY PROBLEMS, HOW DIFFICULT HAVE THESE PROBLEMS MADE IT FOR YOU TO DO YOUR WORK, TAKE CARE OF THINGS AT HOME, OR GET ALONG WITH OTHER PEOPLE: SOMEWHAT DIFFICULT
SUM OF ALL RESPONSES TO PHQ QUESTIONS 1-9: 7

## 2019-09-17 ASSESSMENT — MIFFLIN-ST. JEOR: SCORE: 1322.21

## 2019-09-18 ASSESSMENT — PATIENT HEALTH QUESTIONNAIRE - PHQ9: SUM OF ALL RESPONSES TO PHQ QUESTIONS 1-9: 7

## 2019-09-18 ASSESSMENT — ANXIETY QUESTIONNAIRES: GAD7 TOTAL SCORE: 0

## 2019-11-05 ENCOUNTER — HEALTH MAINTENANCE LETTER (OUTPATIENT)
Age: 34
End: 2019-11-05

## 2020-02-15 ENCOUNTER — VIRTUAL VISIT (OUTPATIENT)
Dept: FAMILY MEDICINE | Facility: OTHER | Age: 35
End: 2020-02-15

## 2020-02-15 NOTE — PROGRESS NOTES
"Date: 02/15/2020 01:45:07  Clinician: Seema Castillo  Clinician NPI: [id number]  Patient: Seema Navas  Patient : 1985  Patient Address: [address] [place] [id number]  Patient Phone: [phone]  Visit Protocol: URI  Patient Summary:  Seema is a 34 year old ( : 1985 ) female who initiated a Visit for cold, sinus infection, or influenza. When asked the question \"Please sign me up to receive news, health information and promotions from [facility].\", Seema responded \"No\".    Seema states her symptoms started gradually 7-9 days ago. After her symptoms started, they improved and then got worse again.   Her symptoms consist of a cough, facial pain or pressure, a headache, a sore throat, ear pain, nasal congestion, and rhinitis.   Symptom details     Nasal secretions: The color of her mucus is clear and yellow.    Cough: Seema coughs every 5-10 minutes and her cough is not more bothersome at night. Phlegm comes into her throat when she coughs. She does not believe the phlegm causes the cough. The color of the phlegm is yellow.     Sore throat: Seema reports having moderate throat pain (4-6 on a 10 point pain scale), does not have exudate on her tonsils, and can swallow liquids. She is not sure if the lymph nodes in her neck are enlarged. A rash has not appeared on the skin since the sore throat started.     Facial pain or pressure: The facial pain or pressure does not feel worse when bending or leaning forward.     Headache: She states the headache is moderate (4-6 on a 10 point pain scale).      Seema denies having myalgias, chills, malaise, fever, wheezing, and teeth pain. She also denies having recent facial or sinus surgery in the past 60 days, having a sinus infection within the past year, and taking antibiotic medication for the symptoms. She is not experiencing dyspnea.   Precipitating events  Within the past week, Seema has not been exposed to someone with strep throat. She has recently " been exposed to someone with influenza. Seema has not been in close contact with any high risk individuals.   Seema has not traveled internationally in the last 14 days before the start of her symptoms.   Pertinent medical history  Seema does not get yeast infections when she takes antibiotics.   Weight: 140 lbs   Seema does not smoke or use smokeless tobacco.   She denies pregnancy and denies breastfeeding. She has menstruated in the past month.   Weight: 140 lbs    MEDICATIONS: sertraline oral, Aviane oral, ALLERGIES: NKDA  Clinician Response:  Dear Seema,  Based on the information provided, you have acute bacterial sinusitis, also known as a sinus infection. Sinus infections are caused by bacteria or a virus and symptoms are almost always identical. The difference between the 2 types of infections is timing.  Sinus infections start as viral infections and symptoms improve on their own in about 7 days. If symptoms have not improved after 7 days or have even worsened, a bacterial infection may have developed.  Medication information  I am prescribing:       Fluticasone 50 mcg/actuation nasal spray. Inhale 2 sprays in each nostril 1 time per day; after 1 week, may adjust to 1 - 2 sprays in each nostril 1 time per day. This medication takes several days to start working, so keep taking it even if it doesn't help right away. There are no refills with this prescription.      Amoxicillin-pot clavulanate 875-125 mg oral tablet. Take 1 tablet by mouth every 12 hours for 10 days. There are no refills with this prescription.     Yeast infections can be a common side effect of antibiotics. The most common symptom of a yeast infection is itchiness in and around the vagina. Other signs and symptoms include burning, redness, or a thick, white vaginal discharge that looks like cottage cheese and does not have a bad smell.  If you become pregnant during this course of treatment, stop taking the medication and contact your primary  care provider.  Unless you are allergic to the over-the-counter medication(s) below, I recommend using:       Acetaminophen (Tylenol or store brand) oral tablet. Take 1-2 tablets by mouth every 4-6 hours to help with the discomfort.      Ibuprofen (Advil or store brand) 200 mg oral tablet. Take 1-3 tablets (200-600 mg) by mouth every 8 hours to help with the discomfort. Make sure to take the ibuprofen with food. Do not exceed 2400 mg in 24 hours.    A decongestant such as Sudafed PE or store brand.     Over-the-counter medications do not require a prescription. Ask the pharmacist if you have any questions.  Self care  The following tips will keep you as comfortable as possible while you recover:     Rest    Drink plenty of water and other liquids    Take a hot shower to loosen congestion    Use throat lozenges    Gargle with warm salt water (1/4 teaspoon of salt per 8 ounce glass of water)    Suck on frozen items such as popsicles or ice cubes    Drink hot tea with lemon and honey    Take a spoonful of honey to reduce your cough     When to seek care  Please be seen in a clinic or urgent care if any of the following occur:     Symptoms do not start to improve after 3 days of treatment    New symptoms develop, or symptoms become worse     It is possible to have an allergic reaction to an antibiotic even if you have not had one in the past. If you notice a new rash, significant swelling, or difficulty breathing, stop taking this medication immediately and go to a clinic or urgent care.  Call 911 or go to the emergency room if you feel that your throat is closing off, you suddenly develop a rash, you are unable to swallow fluids, you are drooling, or you are having difficulty breathing.   Diagnosis: Acute bacterial sinusitis  Diagnosis ICD: J01.90  Prescription: fluticasone 50 mcg/actuation nasal spray,suspension 1 120 spray aerosol with adapter (grams), 30 days supply. Inhale 2 sprays in each nostril 1 time per day;  after 1 week, may adjust to 1 - 2 sprays in each nostril 1 time per day.. Refills: 0, Refill as needed: no, Allow substitutions: yes  Prescription: amoxicillin-pot clavulanate 875-125 mg oral tablet 20 tablet, 10 days supply. Take 1 tablet by mouth every 12 hours for 10 days. Refills: 0, Refill as needed: no, Allow substitutions: yes  Pharmacy: [id number] - [phone] - [address] [phone]

## 2020-09-27 DIAGNOSIS — F33.0 MAJOR DEPRESSIVE DISORDER, RECURRENT EPISODE, MILD (H): ICD-10-CM

## 2020-10-01 RX ORDER — SERTRALINE HYDROCHLORIDE 100 MG/1
TABLET, FILM COATED ORAL
Qty: 30 TABLET | Refills: 0 | Status: SHIPPED | OUTPATIENT
Start: 2020-10-01 | End: 2020-11-05 | Stop reason: DRUGHIGH

## 2020-11-05 ENCOUNTER — VIRTUAL VISIT (OUTPATIENT)
Dept: FAMILY MEDICINE | Facility: CLINIC | Age: 35
End: 2020-11-05
Payer: COMMERCIAL

## 2020-11-05 DIAGNOSIS — Z30.41 ENCOUNTER FOR SURVEILLANCE OF CONTRACEPTIVE PILLS: ICD-10-CM

## 2020-11-05 DIAGNOSIS — F33.0 MAJOR DEPRESSIVE DISORDER, RECURRENT EPISODE, MILD (H): ICD-10-CM

## 2020-11-05 PROCEDURE — 99214 OFFICE O/P EST MOD 30 MIN: CPT | Mod: 95 | Performed by: FAMILY MEDICINE

## 2020-11-05 RX ORDER — SERTRALINE HYDROCHLORIDE 100 MG/1
50 TABLET, FILM COATED ORAL DAILY
Qty: 90 TABLET | Refills: 3 | Status: CANCELLED | OUTPATIENT
Start: 2020-11-05

## 2020-11-05 RX ORDER — SERTRALINE HYDROCHLORIDE 100 MG/1
150 TABLET, FILM COATED ORAL DAILY
Qty: 135 TABLET | Refills: 3 | Status: SHIPPED | OUTPATIENT
Start: 2020-11-05 | End: 2021-10-28

## 2020-11-05 RX ORDER — LEVONORGESTREL/ETHIN.ESTRADIOL 0.1-0.02MG
1 TABLET ORAL DAILY
Qty: 84 TABLET | Refills: 3 | Status: SHIPPED | OUTPATIENT
Start: 2020-11-05 | End: 2021-11-03

## 2020-11-05 ASSESSMENT — ANXIETY QUESTIONNAIRES
6. BECOMING EASILY ANNOYED OR IRRITABLE: SEVERAL DAYS
1. FEELING NERVOUS, ANXIOUS, OR ON EDGE: MORE THAN HALF THE DAYS
2. NOT BEING ABLE TO STOP OR CONTROL WORRYING: MORE THAN HALF THE DAYS
GAD7 TOTAL SCORE: 10
4. TROUBLE RELAXING: MORE THAN HALF THE DAYS
7. FEELING AFRAID AS IF SOMETHING AWFUL MIGHT HAPPEN: SEVERAL DAYS
GAD7 TOTAL SCORE: 10
3. WORRYING TOO MUCH ABOUT DIFFERENT THINGS: MORE THAN HALF THE DAYS
GAD7 TOTAL SCORE: 10
5. BEING SO RESTLESS THAT IT IS HARD TO SIT STILL: NOT AT ALL
7. FEELING AFRAID AS IF SOMETHING AWFUL MIGHT HAPPEN: SEVERAL DAYS

## 2020-11-05 ASSESSMENT — PATIENT HEALTH QUESTIONNAIRE - PHQ9
10. IF YOU CHECKED OFF ANY PROBLEMS, HOW DIFFICULT HAVE THESE PROBLEMS MADE IT FOR YOU TO DO YOUR WORK, TAKE CARE OF THINGS AT HOME, OR GET ALONG WITH OTHER PEOPLE: VERY DIFFICULT
SUM OF ALL RESPONSES TO PHQ QUESTIONS 1-9: 15
SUM OF ALL RESPONSES TO PHQ QUESTIONS 1-9: 15

## 2020-11-05 NOTE — PATIENT INSTRUCTIONS
1)  Increase your sertraline to 150mg (one and a half tablets) daily.   2) If you are not noticing improvement in the next month, them please schedule a video visit with me so we can discuss other options.   3) I gave you a referral to psychiatry should you need it in the future as well.   4) I did not remind you during our visit today, but want to let you know that you are due for a tetanus shot. You can get this at a local pharmacy. I also recommend a flu shot.

## 2020-11-06 RX ORDER — SERTRALINE HYDROCHLORIDE 100 MG/1
TABLET, FILM COATED ORAL
Qty: 30 TABLET | Refills: 0 | OUTPATIENT
Start: 2020-11-06

## 2020-11-06 ASSESSMENT — PATIENT HEALTH QUESTIONNAIRE - PHQ9: SUM OF ALL RESPONSES TO PHQ QUESTIONS 1-9: 15

## 2020-11-06 ASSESSMENT — ANXIETY QUESTIONNAIRES: GAD7 TOTAL SCORE: 10

## 2020-11-22 ENCOUNTER — HEALTH MAINTENANCE LETTER (OUTPATIENT)
Age: 35
End: 2020-11-22

## 2021-09-19 ENCOUNTER — HEALTH MAINTENANCE LETTER (OUTPATIENT)
Age: 36
End: 2021-09-19

## 2021-10-26 DIAGNOSIS — F33.0 MAJOR DEPRESSIVE DISORDER, RECURRENT EPISODE, MILD (H): ICD-10-CM

## 2021-10-28 RX ORDER — SERTRALINE HYDROCHLORIDE 100 MG/1
TABLET, FILM COATED ORAL
Qty: 45 TABLET | Refills: 0 | Status: SHIPPED | OUTPATIENT
Start: 2021-10-28 | End: 2021-11-30

## 2021-10-28 NOTE — TELEPHONE ENCOUNTER
.Routing refill request to provider for review/approval because:  Patient needs to be seen because:  It has almost been 1 year since last visit  PHQ-9 score greater than 4 per [facility] protocol  PHQ-9 score:    PHQ 11/5/2020   PHQ-9 Total Score 15   Q9: Thoughts of better off dead/self-harm past 2 weeks Not at all       Team Coordinators: Please contact patient to set up annual physical for any further refills.     PAT Ventura RN  [facility]

## 2021-11-01 DIAGNOSIS — Z30.41 ENCOUNTER FOR SURVEILLANCE OF CONTRACEPTIVE PILLS: ICD-10-CM

## 2021-11-03 RX ORDER — LEVONORGESTREL AND ETHINYL ESTRADIOL 0.1-0.02MG
KIT ORAL
Qty: 84 TABLET | Refills: 0 | Status: SHIPPED | OUTPATIENT
Start: 2021-11-03 | End: 2022-01-11

## 2021-11-03 NOTE — TELEPHONE ENCOUNTER
Contraceptives Protocol [id number]/01/2021 12:29 AM   Patient is not a current smoker if age is 35 or older Protocol Details    Recent (12 mo) or future (30 days) visit within the authorizing provider's specialty     Medication is active on med list     No active pregnancy on record     No positive pregnancy test in past 12 months

## 2021-11-29 DIAGNOSIS — F33.0 MAJOR DEPRESSIVE DISORDER, RECURRENT EPISODE, MILD (H): ICD-10-CM

## 2021-11-30 NOTE — TELEPHONE ENCOUNTER
Routing refill request to provider for review/approval because:  --Last order sent 10/28/21 was enmanuel with reminder.  --Due for annual visit.  --Last visit virtual 11/5/2020 with [facility].  --Last PHQ-9 >4.  --VM reminder left for patient 10/28/21.        ,  --Please contact patient and ask to schedule an annual visit/physical.    --If you do not reach live, please send letter OR MyChart.  --A refill request for medication was sent to the provider.                    PHQ 9/4/2018 9/17/2019 11/5/2020   PHQ-9 Total Score 8 7 15   Q9: Thoughts of better off dead/self-harm past 2 weeks Not at all Not at all Not at all

## 2021-12-01 RX ORDER — SERTRALINE HYDROCHLORIDE 100 MG/1
TABLET, FILM COATED ORAL
Qty: 45 TABLET | Refills: 0 | Status: SHIPPED | OUTPATIENT
Start: 2021-12-01 | End: 2022-01-05

## 2022-01-03 DIAGNOSIS — F33.0 MAJOR DEPRESSIVE DISORDER, RECURRENT EPISODE, MILD (H): ICD-10-CM

## 2022-01-04 NOTE — TELEPHONE ENCOUNTER
Routing refill request to provider for review/approval because:   PHQ-9 score less than 5 in past 6 months    Recent (6 mo) or future (30 days) visit within the authorizing provider's specialty     PHQ-9 score:    PHQ 11/5/2020   PHQ-9 Total Score 15   Q9: Thoughts of better off dead/self-harm past 2 weeks Not at all     Last Written Prescription Date:  12/1/21  Last Fill Quantity: 45,  # refills: 0, needs appt  Last office visit: virtual 11/5/20 with [facility]  Future Office Visit:      Scheduling: please reach out to pt for annual visit    Paz Rand RN  [facility]

## 2022-01-05 RX ORDER — SERTRALINE HYDROCHLORIDE 100 MG/1
TABLET, FILM COATED ORAL
Qty: 45 TABLET | Refills: 0 | Status: SHIPPED | OUTPATIENT
Start: 2022-01-05 | End: 2022-01-11

## 2022-01-05 NOTE — TELEPHONE ENCOUNTER
Medication was refilled for 1 month.  Please call patient to schedule annual follow-up before further refill is needed.  Asha Stevens RN  [facility]

## 2022-01-09 ENCOUNTER — HEALTH MAINTENANCE LETTER (OUTPATIENT)
Age: 37
End: 2022-01-09

## 2022-01-11 ENCOUNTER — VIRTUAL VISIT (OUTPATIENT)
Dept: FAMILY MEDICINE | Facility: CLINIC | Age: 37
End: 2022-01-11
Payer: COMMERCIAL

## 2022-01-11 DIAGNOSIS — Z30.41 ENCOUNTER FOR SURVEILLANCE OF CONTRACEPTIVE PILLS: ICD-10-CM

## 2022-01-11 DIAGNOSIS — Z13.220 LIPID SCREENING: ICD-10-CM

## 2022-01-11 DIAGNOSIS — F33.0 MAJOR DEPRESSIVE DISORDER, RECURRENT EPISODE, MILD (H): ICD-10-CM

## 2022-01-11 DIAGNOSIS — E61.1 IRON DEFICIENCY: Primary | ICD-10-CM

## 2022-01-11 PROCEDURE — 99214 OFFICE O/P EST MOD 30 MIN: CPT | Mod: 95 | Performed by: FAMILY MEDICINE

## 2022-01-11 RX ORDER — SERTRALINE HYDROCHLORIDE 100 MG/1
TABLET, FILM COATED ORAL
Qty: 135 TABLET | Refills: 3 | Status: SHIPPED | OUTPATIENT
Start: 2022-01-11 | End: 2023-01-20

## 2022-01-11 RX ORDER — LEVONORGESTREL/ETHIN.ESTRADIOL 0.1-0.02MG
1 TABLET ORAL DAILY
Qty: 84 TABLET | Refills: 3 | Status: SHIPPED | OUTPATIENT
Start: 2022-01-11 | End: 2022-12-28

## 2022-01-11 ASSESSMENT — PATIENT HEALTH QUESTIONNAIRE - PHQ9
SUM OF ALL RESPONSES TO PHQ QUESTIONS 1-9: 8
10. IF YOU CHECKED OFF ANY PROBLEMS, HOW DIFFICULT HAVE THESE PROBLEMS MADE IT FOR YOU TO DO YOUR WORK, TAKE CARE OF THINGS AT HOME, OR GET ALONG WITH OTHER PEOPLE: SOMEWHAT DIFFICULT
SUM OF ALL RESPONSES TO PHQ QUESTIONS 1-9: 8

## 2022-01-11 ASSESSMENT — ANXIETY QUESTIONNAIRES
GAD7 TOTAL SCORE: 6
7. FEELING AFRAID AS IF SOMETHING AWFUL MIGHT HAPPEN: SEVERAL DAYS
7. FEELING AFRAID AS IF SOMETHING AWFUL MIGHT HAPPEN: SEVERAL DAYS
1. FEELING NERVOUS, ANXIOUS, OR ON EDGE: SEVERAL DAYS
5. BEING SO RESTLESS THAT IT IS HARD TO SIT STILL: SEVERAL DAYS
4. TROUBLE RELAXING: SEVERAL DAYS
GAD7 TOTAL SCORE: 6
6. BECOMING EASILY ANNOYED OR IRRITABLE: SEVERAL DAYS
3. WORRYING TOO MUCH ABOUT DIFFERENT THINGS: SEVERAL DAYS
2. NOT BEING ABLE TO STOP OR CONTROL WORRYING: NOT AT ALL
GAD7 TOTAL SCORE: 6

## 2022-01-11 NOTE — PROGRESS NOTES
Seema is a 36 year old who is being evaluated via a billable video visit.      How would you like to obtain your AVS? Brennon 2If the video visit is dropped, the invitation should be resent by: Text to cell phone: [phone]  Will anyone else be joining your video visit? No     Video Start Time: 11:21 AM    Assessment & Plan          Major depressive disorder, recurrent episode, mild (H)  stable  -    has been following with her therapist  -     sertraline (ZOLOFT) 100 MG tablet; Take 1.5 tablets (150 mg) by mouth daily     Iron deficiency   Continues to feel somewhat low energy.  She would like a repeat iron test.  Last ferritin level in 2018 was 5.  She did use over-the-counter iron supplement for a while after that, but has stopped taking it.  She does not eat meat but does try to get iron through other sources.  I suggested restarting over-the-counter iron supplementation, but taking it every other day to hopefully improve absorption and reduce side effects.  She will schedule a lab visit to check her ferritin level.  Discussed she may also consider taking her birth control pill continuously for 3 months before having withdrawal bleed.  She does not have very heavy periods and they only last 2 days, so unclear how significant the benefit of this would be.    encounter for surveillance of contraceptive pills  -     Aviane OCP refilled today. Doing well on the medication.     Recommended scheduling her preventive visit this summer.  I also recommended she get her flu shot and tetanus shot in the meantime.    Lipid screening  She would like to do a lipid screening and will schedule lab visit for lipids.        Return in about 6 months (around 7/11/2022) for Routine preventive.    Alondra Tatum MD  [facility]    Subjective   Seema is a 36 year old who presents for the following health issues      History of Present Illness       Mental Health Follow-up:  Patient presents to follow-up  on Depression & Anxiety.Patient's depression since last visit has been:  Medium  The patient is having other symptoms associated with depression.  Patient's anxiety since last visit has been:  Medium  The patient is having other symptoms associated with anxiety.  Any significant life events: No  Patient is not feeling anxious or having panic attacks.  Patient has no concerns about alcohol or drug use.     Social History  Tobacco Use    Smoking status: Never Smoker    Smokeless tobacco: Never Used  Alcohol use: Yes    Alcohol/week: 0.8 - 1.7 standard drinks    Comment: minimal  Drug use: No      Today's PHQ-9         PHQ-9 Total Score:     (P) 8   PHQ-9 Q9 Thoughts of better off dead/self-harm past 2 weeks :   (P) Not at all   Thoughts of suicide or self harm:      Self-harm Plan:        Self-harm Action:          Safety concerns for self or others:             She would like her iron retested.  She would also like to check screening lipids.       Review of Systems          Objective           Vitals:  No vitals were obtained today due to virtual visit.    Physical Exam   GENERAL: Healthy, alert and no distress  EYES: Eyes grossly normal to inspection.  No discharge or erythema, or obvious scleral/conjunctival abnormalities.  RESP: No audible wheeze, cough, or visible cyanosis.  No visible retractions or increased work of breathing.    SKIN: Visible skin clear. No significant rash, abnormal pigmentation or lesions.  NEURO: Cranial nerves grossly intact.  Mentation and speech appropriate for age.  PSYCH: Mentation appears normal, affect normal/bright, judgement and insight intact, normal speech and appearance well-groomed.                  Video-Visit Details    Type of service:  Video Visit    Video End Time:11:36 AM    Originating Location (pt. Location): Home    Distant Location (provider location):  [facility]     Platform used for Video Visit: Content Syndicate: Words on Demand  Answers for HPI/ROS submitted by  the patient on 1/11/2022  If you checked off any problems, how difficult have these problems made it for you to do your work, take care of things at home, or get along with other people?: Somewhat difficult  PHQ9 TOTAL SCORE: 8  MARIELA 7 TOTAL SCORE: 6

## 2022-01-12 ASSESSMENT — ANXIETY QUESTIONNAIRES: GAD7 TOTAL SCORE: 6

## 2022-01-12 ASSESSMENT — PATIENT HEALTH QUESTIONNAIRE - PHQ9: SUM OF ALL RESPONSES TO PHQ QUESTIONS 1-9: 8

## 2022-04-27 ENCOUNTER — LAB (OUTPATIENT)
Dept: LAB | Facility: CLINIC | Age: 37
End: 2022-04-27
Payer: COMMERCIAL

## 2022-04-27 DIAGNOSIS — Z13.220 LIPID SCREENING: ICD-10-CM

## 2022-04-27 DIAGNOSIS — E61.1 IRON DEFICIENCY: ICD-10-CM

## 2022-04-27 LAB
CHOLEST SERPL-MCNC: 211 MG/DL
FASTING STATUS PATIENT QL REPORTED: YES
FERRITIN SERPL-MCNC: 21 NG/ML (ref 12–150)
HDLC SERPL-MCNC: 86 MG/DL
LDLC SERPL CALC-MCNC: 108 MG/DL
NONHDLC SERPL-MCNC: 125 MG/DL
TRIGL SERPL-MCNC: 83 MG/DL

## 2022-04-27 PROCEDURE — 80061 LIPID PANEL: CPT

## 2022-04-27 PROCEDURE — 36415 COLL VENOUS BLD VENIPUNCTURE: CPT

## 2022-04-27 PROCEDURE — 82728 ASSAY OF FERRITIN: CPT

## 2022-04-28 NOTE — RESULT ENCOUNTER NOTE
Your lipids were stable and in good range. Your ferritin has improved, though could still be a little higher (continue your iron supplement). Please call or send a [employer] message if you have any questions. Alondra Tatum M.D.

## 2022-08-26 NOTE — TELEPHONE ENCOUNTER
[facility]- Physician Orders (Recertification of Plan of Care from 09/06/2022 to 11/04/2022 for patient Apryl De La Garza was placed in Dr. Mcdonald's folder on 8/26/2022@9:06am.//PT.   Medication is being filled for 1 time refill only due to:  Patient needs to be seen because it has been more than one year since last visit.   Pt is scheduled.  Dina Robert RN

## 2022-11-21 ENCOUNTER — HEALTH MAINTENANCE LETTER (OUTPATIENT)
Age: 37
End: 2022-11-21

## 2023-01-19 ASSESSMENT — ENCOUNTER SYMPTOMS
NAUSEA: 0
BREAST MASS: 0
DYSURIA: 0
JOINT SWELLING: 0
ABDOMINAL PAIN: 0
CONSTIPATION: 0
NERVOUS/ANXIOUS: 0
DIARRHEA: 0
HEMATOCHEZIA: 0
EYE PAIN: 0
DIZZINESS: 0
PALPITATIONS: 0
FREQUENCY: 0
SORE THROAT: 0
SHORTNESS OF BREATH: 0
PARESTHESIAS: 0
ARTHRALGIAS: 0
HEMATURIA: 0
FEVER: 0
HEADACHES: 0
CHILLS: 0
MYALGIAS: 0
WEAKNESS: 0
HEARTBURN: 0
COUGH: 0

## 2023-01-20 ENCOUNTER — OFFICE VISIT (OUTPATIENT)
Dept: FAMILY MEDICINE | Facility: CLINIC | Age: 38
End: 2023-01-20
Payer: COMMERCIAL

## 2023-01-20 VITALS
OXYGEN SATURATION: 96 % | DIASTOLIC BLOOD PRESSURE: 75 MMHG | RESPIRATION RATE: 13 BRPM | HEART RATE: 75 BPM | TEMPERATURE: 97.9 F | HEIGHT: 64 IN | BODY MASS INDEX: 24.59 KG/M2 | SYSTOLIC BLOOD PRESSURE: 112 MMHG | WEIGHT: 144 LBS

## 2023-01-20 DIAGNOSIS — Z30.41 ENCOUNTER FOR SURVEILLANCE OF CONTRACEPTIVE PILLS: ICD-10-CM

## 2023-01-20 DIAGNOSIS — Z13.220 SCREENING CHOLESTEROL LEVEL: ICD-10-CM

## 2023-01-20 DIAGNOSIS — Z13.0 SCREENING FOR ENDOCRINE, METABOLIC AND IMMUNITY DISORDER: ICD-10-CM

## 2023-01-20 DIAGNOSIS — Z23 ENCOUNTER FOR IMMUNIZATION: ICD-10-CM

## 2023-01-20 DIAGNOSIS — Z00.00 ROUTINE ADULT HEALTH MAINTENANCE: Primary | ICD-10-CM

## 2023-01-20 DIAGNOSIS — Z13.228 SCREENING FOR ENDOCRINE, METABOLIC AND IMMUNITY DISORDER: ICD-10-CM

## 2023-01-20 DIAGNOSIS — Z13.0 SCREENING FOR DEFICIENCY ANEMIA: ICD-10-CM

## 2023-01-20 DIAGNOSIS — M54.50 CHRONIC BILATERAL LOW BACK PAIN WITHOUT SCIATICA: ICD-10-CM

## 2023-01-20 DIAGNOSIS — F33.0 MILD EPISODE OF RECURRENT MAJOR DEPRESSIVE DISORDER (H): ICD-10-CM

## 2023-01-20 DIAGNOSIS — G89.29 CHRONIC BILATERAL LOW BACK PAIN WITHOUT SCIATICA: ICD-10-CM

## 2023-01-20 DIAGNOSIS — Z13.29 SCREENING FOR ENDOCRINE, METABOLIC AND IMMUNITY DISORDER: ICD-10-CM

## 2023-01-20 DIAGNOSIS — Z12.4 CERVICAL CANCER SCREENING: ICD-10-CM

## 2023-01-20 LAB
ANION GAP SERPL CALCULATED.3IONS-SCNC: 13 MMOL/L (ref 7–15)
BUN SERPL-MCNC: 8.9 MG/DL (ref 6–20)
CALCIUM SERPL-MCNC: 9.8 MG/DL (ref 8.6–10)
CHLORIDE SERPL-SCNC: 101 MMOL/L (ref 98–107)
CHOLEST SERPL-MCNC: 224 MG/DL
CREAT SERPL-MCNC: 0.81 MG/DL (ref 0.51–0.95)
DEPRECATED HCO3 PLAS-SCNC: 24 MMOL/L (ref 22–29)
ERYTHROCYTE [DISTWIDTH] IN BLOOD BY AUTOMATED COUNT: 12.5 % (ref 10–15)
GFR SERPL CREATININE-BSD FRML MDRD: >90 ML/MIN/1.73M2
GLUCOSE SERPL-MCNC: 86 MG/DL (ref 70–99)
HCT VFR BLD AUTO: 41 % (ref 35–47)
HDLC SERPL-MCNC: 88 MG/DL
HGB BLD-MCNC: 13.7 G/DL (ref 11.7–15.7)
LDLC SERPL CALC-MCNC: 121 MG/DL
MCH RBC QN AUTO: 29 PG (ref 26.5–33)
MCHC RBC AUTO-ENTMCNC: 33.4 G/DL (ref 31.5–36.5)
MCV RBC AUTO: 87 FL (ref 78–100)
NONHDLC SERPL-MCNC: 136 MG/DL
PLATELET # BLD AUTO: 298 10E3/UL (ref 150–450)
POTASSIUM SERPL-SCNC: 5.1 MMOL/L (ref 3.4–5.3)
RBC # BLD AUTO: 4.73 10E6/UL (ref 3.8–5.2)
SODIUM SERPL-SCNC: 138 MMOL/L (ref 136–145)
TRIGL SERPL-MCNC: 77 MG/DL
TSH SERPL DL<=0.005 MIU/L-ACNC: 2.94 UIU/ML (ref 0.3–4.2)
WBC # BLD AUTO: 5.5 10E3/UL (ref 4–11)

## 2023-01-20 PROCEDURE — 96127 BRIEF EMOTIONAL/BEHAV ASSMT: CPT | Performed by: PHYSICIAN ASSISTANT

## 2023-01-20 PROCEDURE — G0145 SCR C/V CYTO,THINLAYER,RESCR: HCPCS | Performed by: PHYSICIAN ASSISTANT

## 2023-01-20 PROCEDURE — 80061 LIPID PANEL: CPT | Performed by: PHYSICIAN ASSISTANT

## 2023-01-20 PROCEDURE — 99213 OFFICE O/P EST LOW 20 MIN: CPT | Mod: 25 | Performed by: PHYSICIAN ASSISTANT

## 2023-01-20 PROCEDURE — 90715 TDAP VACCINE 7 YRS/> IM: CPT | Performed by: PHYSICIAN ASSISTANT

## 2023-01-20 PROCEDURE — 87624 HPV HI-RISK TYP POOLED RSLT: CPT | Performed by: PHYSICIAN ASSISTANT

## 2023-01-20 PROCEDURE — 36415 COLL VENOUS BLD VENIPUNCTURE: CPT | Performed by: PHYSICIAN ASSISTANT

## 2023-01-20 PROCEDURE — 90471 IMMUNIZATION ADMIN: CPT | Performed by: PHYSICIAN ASSISTANT

## 2023-01-20 PROCEDURE — 80048 BASIC METABOLIC PNL TOTAL CA: CPT | Performed by: PHYSICIAN ASSISTANT

## 2023-01-20 PROCEDURE — 99395 PREV VISIT EST AGE 18-39: CPT | Mod: 25 | Performed by: PHYSICIAN ASSISTANT

## 2023-01-20 PROCEDURE — 84443 ASSAY THYROID STIM HORMONE: CPT | Performed by: PHYSICIAN ASSISTANT

## 2023-01-20 PROCEDURE — 85027 COMPLETE CBC AUTOMATED: CPT | Performed by: PHYSICIAN ASSISTANT

## 2023-01-20 RX ORDER — LEVONORGESTREL/ETHIN.ESTRADIOL 0.1-0.02MG
1 TABLET ORAL DAILY
Qty: 84 TABLET | Refills: 4 | Status: SHIPPED | OUTPATIENT
Start: 2023-01-20 | End: 2024-02-27

## 2023-01-20 RX ORDER — SERTRALINE HYDROCHLORIDE 100 MG/1
TABLET, FILM COATED ORAL
Qty: 135 TABLET | Refills: 3 | Status: SHIPPED | OUTPATIENT
Start: 2023-01-20 | End: 2024-02-01

## 2023-01-20 ASSESSMENT — ENCOUNTER SYMPTOMS
HEMATURIA: 0
MYALGIAS: 0
FEVER: 0
JOINT SWELLING: 0
ARTHRALGIAS: 0
CHILLS: 0
WEAKNESS: 0
DYSURIA: 0
NERVOUS/ANXIOUS: 0
SORE THROAT: 0
SHORTNESS OF BREATH: 0
PALPITATIONS: 0
NAUSEA: 0
ABDOMINAL PAIN: 0
HEMATOCHEZIA: 0
COUGH: 0
DIARRHEA: 0
DIZZINESS: 0
HEADACHES: 0
CONSTIPATION: 0
EYE PAIN: 0
PARESTHESIAS: 0
HEARTBURN: 0
FREQUENCY: 0
BREAST MASS: 0

## 2023-01-20 ASSESSMENT — PATIENT HEALTH QUESTIONNAIRE - PHQ9
SUM OF ALL RESPONSES TO PHQ QUESTIONS 1-9: 6
10. IF YOU CHECKED OFF ANY PROBLEMS, HOW DIFFICULT HAVE THESE PROBLEMS MADE IT FOR YOU TO DO YOUR WORK, TAKE CARE OF THINGS AT HOME, OR GET ALONG WITH OTHER PEOPLE: SOMEWHAT DIFFICULT
SUM OF ALL RESPONSES TO PHQ QUESTIONS 1-9: 6

## 2023-01-20 ASSESSMENT — PAIN SCALES - GENERAL: PAINLEVEL: NO PAIN (0)

## 2023-01-20 NOTE — PROGRESS NOTES
SUBJECTIVE:   CC: Seema is an 37 year old who presents for preventive health visit.   Patient has been advised of split billing requirements and indicates understanding: Yes  Healthy Habits:     Getting at least 3 servings of Calcium per day:  NO    Bi-annual eye exam:  Yes    Dental care twice a year:  Yes    Sleep apnea or symptoms of sleep apnea:  None    Diet:  Vegetarian/vegan    Frequency of exercise:  None    Taking medications regularly:  Yes    Medication side effects:  None    PHQ-2 Total Score: 2    Additional concerns today:  No    H/o long ago injury, damanged disc  Chronic low back pain  Has gotten worse in past year - bending over hard, exercise hard  Wonders about PT    Overall, feels mental health is stable, due for refills sertraline    Today's PHQ-2 Score:   PHQ-2 ( 1999 [employer]) 1/19/2023   Q1: Little interest or pleasure in doing things 1   Q2: Feeling down, depressed or hopeless 1   PHQ-2 Score 2   PHQ-2 Total Score (12-17 Years)- Positive if 3 or more points; Administer PHQ-A if positive -   Q1: Little interest or pleasure in doing things Several days   Q2: Feeling down, depressed or hopeless Several days   PHQ-2 Score 2     Have you ever done Advance Care Planning? (For example, a Health Directive, POLST, or a discussion with a medical provider or your loved ones about your wishes): No, advance care planning information given to patient to review.  Patient declined advance care planning discussion at this time.    Social History     Tobacco Use     Smoking status: Never     Smokeless tobacco: Never   Substance Use Topics     Alcohol use: Yes     Alcohol/week: 0.8 - 1.7 standard drinks     Comment: minimal     If you drink alcohol do you typically have >3 drinks per day or >7 drinks per week? No    No flowsheet data found.    Reviewed orders with patient.  Reviewed health maintenance and updated orders accordingly - Yes    Breast Cancer Screening:    Breast CA Risk Assessment (FHS-7)  "11/27/2021 1/19/2023   Do you have a family history of breast, colon, or ovarian cancer? Yes No / Unknown         Patient under 40 years of age: Routine Mammogram Screening not recommended.   Pertinent mammograms are reviewed under the imaging tab.    History of abnormal Pap smear: NO - age 30-65 PAP every 5 years with negative HPV co-testing recommended  PAP / HPV Latest Ref Rng & Units 10/10/2017 11/10/2014 10/21/2013   PAP (Historical) - NIL NIL NIL   HPV16 NEG:Negative Negative - -   HPV18 NEG:Negative Negative - -   HRHPV NEG:Negative Negative - -     Reviewed and updated as needed this visit by clinical staff   Tobacco  Allergies  Meds  Problems  Med Hx  Surg Hx  Fam Hx          Reviewed and updated as needed this visit by Provider    Allergies   Problems  Med Hx  Surg Hx  Fam Hx           Review of Systems   Constitutional: Negative for chills and fever.   HENT: Negative for congestion, ear pain, hearing loss and sore throat.    Eyes: Negative for pain and visual disturbance.   Respiratory: Negative for cough and shortness of breath.    Cardiovascular: Negative for chest pain, palpitations and peripheral edema.   Gastrointestinal: Negative for abdominal pain, constipation, diarrhea, heartburn, hematochezia and nausea.   Breasts:  Negative for tenderness, breast mass and discharge.   Genitourinary: Negative for dysuria, frequency, genital sores, hematuria, pelvic pain, urgency, vaginal bleeding and vaginal discharge.   Musculoskeletal: Negative for arthralgias, joint swelling and myalgias.   Skin: Negative for rash.   Neurological: Negative for dizziness, weakness, headaches and paresthesias.   Psychiatric/Behavioral: Negative for mood changes. The patient is not nervous/anxious.         OBJECTIVE:   /75 (BP Location: Right arm, Patient Position: Sitting, Cuff Size: Adult Regular)   Pulse 75   Temp 97.9  F (36.6  C) (Temporal)   Resp 13   Ht 1.626 m (5' 4\")   Wt 65.3 kg (144 lb)   LMP " 12/26/2022 (Approximate)   SpO2 96%   BMI 24.72 kg/m    Physical Exam  GENERAL: healthy, alert and no distress  EYES: Eyes grossly normal to inspection, PERRL and conjunctivae and sclerae normal  HENT: ear canals and TM's normal, nose and mouth without ulcers or lesions  NECK: no adenopathy, no asymmetry, masses, or scars and thyroid normal to palpation  RESP: lungs clear to auscultation - no rales, rhonchi or wheezes  BREAST: normal without masses, tenderness or nipple discharge and no palpable axillary masses or adenopathy  CV: regular rate and rhythm, normal S1 S2, no S3 or S4, no murmur, click or rub, no peripheral edema and peripheral pulses strong  ABDOMEN: soft, nontender, no hepatosplenomegaly, no masses and bowel sounds normal   (female): External genitalia normal, urethra normal, vagina normal, cervix normal without lesions. Uterus, ovaries, bladder, adnexa normal per bimanual exam. Pap collected.  MS: no gross musculoskeletal defects noted, no edema  SKIN: no suspicious lesions or rashes  NEURO: Normal strength and tone, mentation intact and speech normal  PSYCH: mentation appears normal, affect normal/bright      ASSESSMENT/PLAN:   Seema was seen today for physical.    Diagnoses and all orders for this visit:    Routine adult health maintenance  -     REVIEW OF HEALTH MAINTENANCE PROTOCOL ORDERS    Mild episode of recurrent major depressive disorder (H) - stable, refills provided  -     sertraline (ZOLOFT) 100 MG tablet; TAKE 1.5 TABLETS BY MOUTH EVERY DAY    Encounter for surveillance of contraceptive pills  -     levonorgestrel-ethinyl estradiol (AVIANE) 0.1-20 MG-MCG tablet; Take 1 tablet by mouth daily    Chronic bilateral low back pain without sciatica - referral to PT provided. Discussed if no improvement , can consider medical spine referral.  -     Physical Therapy Referral; Future    Screening for deficiency anemia  -     CBC with platelets; Future  -     CBC with platelets    Screening for  endocrine, metabolic and immunity disorder  -     Basic metabolic panel; Future  -     TSH; Future  -     TSH  -     Basic metabolic panel    Screening cholesterol level  -     Lipid panel reflex to direct LDL Non-fasting; Future  -     Lipid panel reflex to direct LDL Non-fasting    Cervical cancer screening  -     Pap Screen with HPV - recommended age 30 - 65 years    Encounter for immunization  -     TDAP VACCINE (Adacel, Boostrix)      COUNSELING:  Reviewed preventive health counseling, as reflected in patient instructions    She reports that she has never smoked. She has never used smokeless tobacco.          Cindy Silver PA-C  [facility]  Answers for HPI/ROS submitted by the patient on 1/20/2023  If you checked off any problems, how difficult have these problems made it for you to do your work, take care of things at home, or get along with other people?: Somewhat difficult  PHQ9 TOTAL SCORE: 6

## 2023-01-20 NOTE — NURSING NOTE
Prior to immunization administration, verified patients identity using patient s name and date of birth. Please see Immunization Activity for additional information.     Screening Questionnaire for Adult Immunization    Are you sick today?   No   Do you have allergies to medications, food, a vaccine component or latex?   No   Have you ever had a serious reaction after receiving a vaccination?   No   Do you have a long-term health problem with heart, lung, kidney, or metabolic disease (e.g., diabetes), asthma, a blood disorder, no spleen, complement component deficiency, a cochlear implant, or a spinal fluid leak?  Are you on long-term aspirin therapy?   No   Do you have cancer, leukemia, HIV/AIDS, or any other immune system problem?   No   Do you have a parent, brother, or sister with an immune system problem?   No   In the past 3 months, have you taken medications that affect  your immune system, such as prednisone, other steroids, or anticancer drugs; drugs for the treatment of rheumatoid arthritis, Crohn s disease, or psoriasis; or have you had radiation treatments?   No   Have you had a seizure, or a brain or other nervous system problem?   No   During the past year, have you received a transfusion of blood or blood    products, or been given immune (gamma) globulin or antiviral drug?   No   For women: Are you pregnant or is there a chance you could become       pregnant during the next month?   No   Have you received any vaccinations in the past 4 weeks?   No     Immunization questionnaire answers were all negative.        Per orders of Dr. Silver, injection of TDap (Adacel) given by Marie Moreno MA. Patient instructed to remain in clinic for 15 minutes afterwards, and to report any adverse reaction to me immediately.       Screening performed by Marie Moreno MA on 1/20/2023 at 9:01 AM.

## 2023-01-23 ENCOUNTER — THERAPY VISIT (OUTPATIENT)
Dept: PHYSICAL THERAPY | Facility: CLINIC | Age: 38
End: 2023-01-23
Payer: COMMERCIAL

## 2023-01-23 DIAGNOSIS — G89.29 CHRONIC BILATERAL LOW BACK PAIN WITHOUT SCIATICA: Primary | ICD-10-CM

## 2023-01-23 DIAGNOSIS — M54.50 BILATERAL LOW BACK PAIN WITHOUT SCIATICA: ICD-10-CM

## 2023-01-23 DIAGNOSIS — M54.50 CHRONIC BILATERAL LOW BACK PAIN WITHOUT SCIATICA: Primary | ICD-10-CM

## 2023-01-23 PROCEDURE — 97161 PT EVAL LOW COMPLEX 20 MIN: CPT | Mod: GP

## 2023-01-23 PROCEDURE — 97140 MANUAL THERAPY 1/> REGIONS: CPT | Mod: GP

## 2023-01-23 PROCEDURE — 97110 THERAPEUTIC EXERCISES: CPT | Mod: GP

## 2023-01-23 NOTE — PROGRESS NOTES
Physical Therapy Initial Evaluation    Therapist Assessment: Seema Navas is a 37 year old female patient presenting to Physical Therapy with bilateral LB pain (with some referral into L hip). Patient demonstrates pain in LB and limitation with standing forward lumbar flexion, pain with R side glide on L side, limited standing lumbar extension (improvement in motion with repeated prone press-ups) and pain with sit to stand transition. Signs and symptoms are consistent with discogenic LBP with SIJ dysfunction. These impairments limit their ability to transfer sit to stand without pain, bend forward from standing position with less pain, put on shoes/socks with less pain, return to yoga, and sit for extended periods of time. Skilled PT services are necessary in order to reduce impairments and improve independent function.    Subjective:  The history is provided by the patient.   Patient Health History  Seema Navas being seen for Lumbar pain- chronic.     Problem began: 1/23/2023.   Problem occurred: Unknown   Pain is reported as 5/10 and 6/10 on pain scale.  General health as reported by patient is fair.  Pertinent medical history includes: none.     Medical allergies: none.   Surgeries include:  None.    Current medications:  Anti-depressants.    Current occupation is [employer] .   Primary job tasks include:  Computer work and prolonged sitting.                  Therapist Generated HPI Evaluation  Problem details: Patient reports to outpatient physical therapy with chronic bilateral LBP that started several years ago that got worse within the past year. Patient used to dance and play soccer (through college). After college is when her pain started, but cannot pinpoint what might have started it. She states that she did physical therapy years ago as well as acupuncture, cupping, and chiropractic care (once). She also had a cortisone shot, but that did not provide much relief (2016). She states  that with PT in the past the PT had her doing bridging and core work. It has not provided long lasting relief. Patient went to the neck and back clinic 10 years ago for back strengthening that seemed to help more. She states that general exercise and movement has helped her pain. Gentle yoga is harder right now due to her pain. Any pose where she is hinging forward seems to bring on more pain. Patient is a side sleeper and sometimes her hips and back get sore. Patient points to the central low back L and R side where more of her pain is. She denies numbness and tingling or any radiation into LEs.    MRI: 3/9/2016  IMPRESSION:    1. At L4-L5 there is mild central stenosis and mild inferior foraminal  narrowing bilaterally.  2. At L5-S1 there is a moderate left foraminal disc protrusion with  moderate left foraminal stenosis with nerve root contact and possible  mild compression.    Goals:  1) return to yoga (hinging forward seems to bring on the pain)  2) regain strength and mobility   3) sit (able to sit for a while, but this position is aggravating) - transfer sit to stand is more painful  4) put on shoes or pants with less pain.         Type of problem:  Lumbar.    This is a chronic condition.      Patient reports pain:  Lumbar spine left, lumbar spine right and central lumbar spine.  Pain is described as sharp and is constant.  Pain radiates to:  No radiation. Pain is worse during the day and worse during the night.  Since onset symptoms are gradually worsening.  Symptoms are exacerbated by bending and sitting  and relieved by heat and NSAID's (occasionally Advil if it is bad).  Special tests included:  MRI.  There was mild improvement following previous treatment.  Restrictions due to condition include:  Working in normal job without restrictions (has a sit to stand desk).  Barriers include:  None as reported by patient.                        Objective:    Gait:    Gait Type:  Normal                     Lumbar/SI Evaluation  ROM:    AROM Lumbar:   Flexion:            Limited, pain  Ext:                    Limited, slight increase in pain   Side Bend:        Left:  + on R side, WNL    Right:  WNL  Rotation:           Left:     Right:   Side Glide:        Left:  WNL    Right:  Pain on L side, slight limitation            Lumbar DTR's:  not assessed      Cord Signs:  not assessed    Lumbar Dermtomes:  not assessed (no sensory changes per patient report. Tenderness just lateral to sacrum bilaterally.)                Neural Tension/Mobility:      Left side:SLR; SLR w/DF or Slump  negative.     Right side:   SLR w/DF; Slump or SLR  negative.   Lumbar Palpation:    Tenderness present at Left:    Quadratus Lumborum  Tenderness present at Right: Quadratus Lumborum  Functional Tests:  Core strength and proprioception lumbar: DL squat: no change in symptoms, normal. x 10 prone press-ups: improvement in extension ROM afterwards, slight improvement in symptoms after third set with OP.        Lumbar Provocation:      Left negative with:  PROM hip    Right negative with:  PROM hip    SI joint/Sacrum:    + Thigh thrust R, neg compression bilaterally, neg distraction, neg sacral thrust.                                             Hip Evaluation    Hip Strength:    Flexion:   Left: 5-/5   -  Pain:  Right: 5-/5   -  Pain:                    Extension:  Left: 4+/5  -  Pain:Right: 4+/5    -  Pain:    Abduction:  Left: 4/5    -   Pain:Right: 4+/5   -   Pain:                                 General     ROS    Assessment/Plan:    Patient is a 37 year old female with lumbar complaints.    Patient has the following significant findings with corresponding treatment plan.                Diagnosis 1:  LBP  Pain -  hot/cold therapy, electric stimulation, mechanical traction, manual therapy, STS, splint/taping/bracing/orthotics, self management, education, directional preference exercise and home program  Decreased ROM/flexibility - manual  therapy, therapeutic exercise, therapeutic activity and home program  Impaired muscle performance - neuro re-education and home program  Decreased function - therapeutic activities and home program  Impaired posture - neuro re-education, therapeutic activities and home program    Therapy Evaluation Codes:     Cumulative Therapy Evaluation is: Low complexity.    Previous and current functional limitations:  (See Goal Flow Sheet for this information)    Short term and Long term goals: (See Goal Flow Sheet for this information)     Communication ability:  Patient appears to be able to clearly communicate and understand verbal and written communication and follow directions correctly.  Treatment Explanation - The following has been discussed with the patient:   RX ordered/plan of care  Anticipated outcomes  Possible risks and side effects  This patient would benefit from PT intervention to resume normal activities.   Rehab potential is excellent.    Frequency:  1 X week, once daily progressing to 1 x every other week  Duration:  for 2-3 months  Discharge Plan:  Achieve all LTG.  Independent in home treatment program.  Reach maximal therapeutic benefit.    Please refer to the daily flowsheet for treatment today, total treatment time and time spent performing 1:1 timed codes.

## 2023-01-24 LAB
BKR LAB AP GYN ADEQUACY: NORMAL
BKR LAB AP GYN INTERPRETATION: NORMAL
BKR LAB AP HPV REFLEX: NORMAL
BKR LAB AP PREVIOUS ABNORMAL: NORMAL
PATH REPORT.COMMENTS IMP SPEC: NORMAL
PATH REPORT.COMMENTS IMP SPEC: NORMAL
PATH REPORT.RELEVANT HX SPEC: NORMAL

## 2023-01-26 LAB
HUMAN PAPILLOMA VIRUS 16 DNA: NEGATIVE
HUMAN PAPILLOMA VIRUS 18 DNA: NEGATIVE
HUMAN PAPILLOMA VIRUS FINAL DIAGNOSIS: NORMAL
HUMAN PAPILLOMA VIRUS OTHER HR: NEGATIVE

## 2023-02-03 ENCOUNTER — THERAPY VISIT (OUTPATIENT)
Dept: PHYSICAL THERAPY | Facility: CLINIC | Age: 38
End: 2023-02-03
Payer: COMMERCIAL

## 2023-02-03 DIAGNOSIS — M54.50 BILATERAL LOW BACK PAIN WITHOUT SCIATICA: Primary | ICD-10-CM

## 2023-02-03 PROCEDURE — 97110 THERAPEUTIC EXERCISES: CPT | Mod: GP

## 2023-02-03 PROCEDURE — 97112 NEUROMUSCULAR REEDUCATION: CPT | Mod: GP

## 2023-02-03 PROCEDURE — 97140 MANUAL THERAPY 1/> REGIONS: CPT | Mod: GP

## 2023-02-08 ENCOUNTER — THERAPY VISIT (OUTPATIENT)
Dept: PHYSICAL THERAPY | Facility: CLINIC | Age: 38
End: 2023-02-08
Payer: COMMERCIAL

## 2023-02-08 DIAGNOSIS — M54.50 BILATERAL LOW BACK PAIN WITHOUT SCIATICA: Primary | ICD-10-CM

## 2023-02-08 PROCEDURE — 97110 THERAPEUTIC EXERCISES: CPT | Mod: GP

## 2023-02-08 PROCEDURE — 97140 MANUAL THERAPY 1/> REGIONS: CPT | Mod: GP

## 2023-02-08 PROCEDURE — 97112 NEUROMUSCULAR REEDUCATION: CPT | Mod: GP

## 2024-01-30 DIAGNOSIS — F33.0 MILD EPISODE OF RECURRENT MAJOR DEPRESSIVE DISORDER (H): ICD-10-CM

## 2024-01-31 ENCOUNTER — MYC REFILL (OUTPATIENT)
Dept: FAMILY MEDICINE | Facility: CLINIC | Age: 39
End: 2024-01-31
Payer: COMMERCIAL

## 2024-01-31 ENCOUNTER — MYC MEDICAL ADVICE (OUTPATIENT)
Dept: FAMILY MEDICINE | Facility: CLINIC | Age: 39
End: 2024-01-31
Payer: COMMERCIAL

## 2024-01-31 DIAGNOSIS — F33.0 MILD EPISODE OF RECURRENT MAJOR DEPRESSIVE DISORDER (H): ICD-10-CM

## 2024-01-31 RX ORDER — SERTRALINE HYDROCHLORIDE 100 MG/1
TABLET, FILM COATED ORAL
Qty: 135 TABLET | Refills: 3 | OUTPATIENT
Start: 2024-01-31

## 2024-02-01 RX ORDER — SERTRALINE HYDROCHLORIDE 100 MG/1
TABLET, FILM COATED ORAL
Qty: 45 TABLET | Refills: 0 | Status: SHIPPED | OUTPATIENT
Start: 2024-02-01 | End: 2024-02-26

## 2024-02-01 ASSESSMENT — ANXIETY QUESTIONNAIRES
7. FEELING AFRAID AS IF SOMETHING AWFUL MIGHT HAPPEN: NOT AT ALL
3. WORRYING TOO MUCH ABOUT DIFFERENT THINGS: NOT AT ALL
6. BECOMING EASILY ANNOYED OR IRRITABLE: NOT AT ALL
GAD7 TOTAL SCORE: 1
GAD7 TOTAL SCORE: 1
5. BEING SO RESTLESS THAT IT IS HARD TO SIT STILL: NOT AT ALL
GAD7 TOTAL SCORE: 1
2. NOT BEING ABLE TO STOP OR CONTROL WORRYING: NOT AT ALL
8. IF YOU CHECKED OFF ANY PROBLEMS, HOW DIFFICULT HAVE THESE MADE IT FOR YOU TO DO YOUR WORK, TAKE CARE OF THINGS AT HOME, OR GET ALONG WITH OTHER PEOPLE?: NOT DIFFICULT AT ALL
7. FEELING AFRAID AS IF SOMETHING AWFUL MIGHT HAPPEN: NOT AT ALL
1. FEELING NERVOUS, ANXIOUS, OR ON EDGE: SEVERAL DAYS
IF YOU CHECKED OFF ANY PROBLEMS ON THIS QUESTIONNAIRE, HOW DIFFICULT HAVE THESE PROBLEMS MADE IT FOR YOU TO DO YOUR WORK, TAKE CARE OF THINGS AT HOME, OR GET ALONG WITH OTHER PEOPLE: NOT DIFFICULT AT ALL
4. TROUBLE RELAXING: NOT AT ALL

## 2024-02-01 ASSESSMENT — PATIENT HEALTH QUESTIONNAIRE - PHQ9
10. IF YOU CHECKED OFF ANY PROBLEMS, HOW DIFFICULT HAVE THESE PROBLEMS MADE IT FOR YOU TO DO YOUR WORK, TAKE CARE OF THINGS AT HOME, OR GET ALONG WITH OTHER PEOPLE: SOMEWHAT DIFFICULT
SUM OF ALL RESPONSES TO PHQ QUESTIONS 1-9: 6
SUM OF ALL RESPONSES TO PHQ QUESTIONS 1-9: 6

## 2024-02-01 NOTE — TELEPHONE ENCOUNTER
Replied to separate [employer] message with PHQ9 and GAD7.    Hi Dr Kruse, I am out of my Zoloft rx (need to schedule a visit) and my renewal request was denied. Could it be renewed just for a month so I don t get the side effects of stopping it immediately? I also have new insurance so I ll need to see if I can still go to [facility] for my care.     Paz MULLEN RN  [facility]

## 2024-02-01 NOTE — TELEPHONE ENCOUNTER
Writer responded via [employer] with questionnaires.  See refill encounter.  Paz MULLEN RN  [facility]

## 2024-02-03 ENCOUNTER — HEALTH MAINTENANCE LETTER (OUTPATIENT)
Age: 39
End: 2024-02-03

## 2024-02-23 DIAGNOSIS — F33.0 MILD EPISODE OF RECURRENT MAJOR DEPRESSIVE DISORDER (H): ICD-10-CM

## 2024-02-26 RX ORDER — SERTRALINE HYDROCHLORIDE 100 MG/1
TABLET, FILM COATED ORAL
Qty: 135 TABLET | Refills: 0 | Status: SHIPPED | OUTPATIENT
Start: 2024-02-26

## 2024-02-27 DIAGNOSIS — Z30.41 ENCOUNTER FOR SURVEILLANCE OF CONTRACEPTIVE PILLS: ICD-10-CM

## 2024-02-27 RX ORDER — LEVONORGESTREL/ETHIN.ESTRADIOL 0.1-0.02MG
1 TABLET ORAL DAILY
Qty: 84 TABLET | Refills: 0 | Status: SHIPPED | OUTPATIENT
Start: 2024-02-27

## 2024-03-25 PROBLEM — M54.50 BILATERAL LOW BACK PAIN WITHOUT SCIATICA: Status: RESOLVED | Noted: 2023-02-03 | Resolved: 2024-03-25

## 2024-03-25 NOTE — PROGRESS NOTES
DISCHARGE SUMMARY    Seema Navas was seen 3 times for evaluation and treatment.  Patient did not return for further treatment and current status is unknown.  Due to short treatment duration, no objective or functional changes were made.  Please see goal flow sheet from episode noted date below and initial evaluation for further information.  Patient is discharged from therapy and therapy episode is resolved as of 03/25/24.      Linked Episodes   Type: Episode: Status: Noted: Resolved: Last update: Updated by:   PHYSICAL THERAPY LBP 1/23/2023 Active 1/23/2023 2/8/2023  8:19 AM Jo Ann Quick, PT      Comments:

## 2024-04-13 ENCOUNTER — HEALTH MAINTENANCE LETTER (OUTPATIENT)
Age: 39
End: 2024-04-13

## 2024-08-12 NOTE — TELEPHONE ENCOUNTER
Refilled 1 time only, please call patient to schedule for appointment.   [Normal] : Psychiatric [[id number]] : See HPI

## 2025-04-19 ENCOUNTER — HEALTH MAINTENANCE LETTER (OUTPATIENT)
Age: 40
End: 2025-04-19